# Patient Record
Sex: MALE | Race: WHITE | Employment: OTHER | URBAN - METROPOLITAN AREA
[De-identification: names, ages, dates, MRNs, and addresses within clinical notes are randomized per-mention and may not be internally consistent; named-entity substitution may affect disease eponyms.]

---

## 2018-02-05 ENCOUNTER — HOSPITAL ENCOUNTER (EMERGENCY)
Age: 73
Discharge: HOME | End: 2018-02-05
Attending: EMERGENCY MEDICINE
Payer: MEDICARE

## 2018-02-05 VITALS
HEART RATE: 58 BPM | SYSTOLIC BLOOD PRESSURE: 146 MMHG | RESPIRATION RATE: 18 BRPM | BODY MASS INDEX: 24.25 KG/M2 | DIASTOLIC BLOOD PRESSURE: 63 MMHG | OXYGEN SATURATION: 99 % | TEMPERATURE: 98.3 F | WEIGHT: 160 LBS | HEIGHT: 68 IN

## 2018-02-05 DIAGNOSIS — I10 ESSENTIAL HYPERTENSION: Primary | ICD-10-CM

## 2018-02-05 LAB
ALBUMIN SERPL-MCNC: 4.1 G/DL (ref 3.9–4.9)
ALP BLD-CCNC: 101 U/L (ref 35–104)
ALT SERPL-CCNC: 21 U/L (ref 0–41)
ANION GAP SERPL CALCULATED.3IONS-SCNC: 9 MEQ/L (ref 7–13)
AST SERPL-CCNC: 24 U/L (ref 0–40)
BILIRUB SERPL-MCNC: 0.5 MG/DL (ref 0–1.2)
BUN BLDV-MCNC: 13 MG/DL (ref 8–23)
CALCIUM SERPL-MCNC: 9.1 MG/DL (ref 8.6–10.2)
CHLORIDE BLD-SCNC: 105 MEQ/L (ref 98–107)
CO2: 28 MEQ/L (ref 22–29)
CREAT SERPL-MCNC: 0.79 MG/DL (ref 0.7–1.2)
EKG ATRIAL RATE: 51 BPM
EKG P AXIS: 57 DEGREES
EKG P-R INTERVAL: 154 MS
EKG Q-T INTERVAL: 422 MS
EKG QRS DURATION: 78 MS
EKG QTC CALCULATION (BAZETT): 388 MS
EKG R AXIS: -2 DEGREES
EKG T AXIS: 27 DEGREES
EKG VENTRICULAR RATE: 51 BPM
GFR AFRICAN AMERICAN: >60
GFR NON-AFRICAN AMERICAN: >60
GLOBULIN: 2.5 G/DL (ref 2.3–3.5)
GLUCOSE BLD-MCNC: 101 MG/DL (ref 74–109)
HCT VFR BLD CALC: 37.5 % (ref 42–52)
HEMOGLOBIN: 12.4 G/DL (ref 14–18)
MCH RBC QN AUTO: 30.9 PG (ref 27–31.3)
MCHC RBC AUTO-ENTMCNC: 33.1 % (ref 33–37)
MCV RBC AUTO: 93.4 FL (ref 80–100)
PDW BLD-RTO: 14.1 % (ref 11.5–14.5)
PLATELET # BLD: 194 K/UL (ref 130–400)
POTASSIUM SERPL-SCNC: 4.3 MEQ/L (ref 3.5–5.1)
RBC # BLD: 4.02 M/UL (ref 4.7–6.1)
SODIUM BLD-SCNC: 142 MEQ/L (ref 132–144)
TOTAL PROTEIN: 6.6 G/DL (ref 6.4–8.1)
WBC # BLD: 6.9 K/UL (ref 4.8–10.8)

## 2018-02-05 PROCEDURE — 36415 COLL VENOUS BLD VENIPUNCTURE: CPT

## 2018-02-05 PROCEDURE — 85027 COMPLETE CBC AUTOMATED: CPT

## 2018-02-05 PROCEDURE — 93005 ELECTROCARDIOGRAM TRACING: CPT

## 2018-02-05 PROCEDURE — 6370000000 HC RX 637 (ALT 250 FOR IP): Performed by: EMERGENCY MEDICINE

## 2018-02-05 PROCEDURE — 99283 EMERGENCY DEPT VISIT LOW MDM: CPT

## 2018-02-05 PROCEDURE — 80053 COMPREHEN METABOLIC PANEL: CPT

## 2018-02-05 RX ORDER — PRAVASTATIN SODIUM 20 MG
20 TABLET ORAL DAILY
COMMUNITY

## 2018-02-05 RX ORDER — LISINOPRIL 20 MG/1
20 TABLET ORAL DAILY
COMMUNITY

## 2018-02-05 RX ORDER — CARVEDILOL 6.25 MG/1
6.25 TABLET ORAL 2 TIMES DAILY
COMMUNITY

## 2018-02-05 RX ORDER — LISINOPRIL 10 MG/1
10 TABLET ORAL ONCE
Status: COMPLETED | OUTPATIENT
Start: 2018-02-05 | End: 2018-02-05

## 2018-02-05 RX ADMIN — LISINOPRIL 10 MG: 10 TABLET ORAL at 12:14

## 2018-02-05 ASSESSMENT — ENCOUNTER SYMPTOMS
FACIAL SWELLING: 0
EYE DISCHARGE: 0
ABDOMINAL PAIN: 0
SHORTNESS OF BREATH: 0
RHINORRHEA: 0
COLOR CHANGE: 0
VOMITING: 0

## 2018-02-05 NOTE — ED PROVIDER NOTES
here in the emergency department and his EKG and basic blood work looked normal.  He is encouraged to attempt to cut his lisinopril 20 mg tablets in half and take another half of a tablet for 30 mg total daily in the evening. He is to keep his Coreg dose the same. If he is able he is to record his blood pressures at different times throughout the day until he is able to follow up with his family care doctor, ideally in 3 days. Signs and symptoms which should prompt his urgent return to the emergency department are reviewed with the patient and his friend who is present at bedside and patient is discharged in stable condition. MDM    CRITICAL CARE TIME   Total Critical Care time was 0 minutes, excluding separately reportable procedures. There was a high probability of clinically significant/life threatening deterioration in the patient's condition which required my urgent intervention. CONSULTS:  None    PROCEDURES:  Unless otherwise noted below, none     Procedures    FINAL IMPRESSION      1.  Essential hypertension          DISPOSITION/PLAN   DISPOSITION Decision To Discharge 02/05/2018 12:50:00 PM      PATIENT REFERRED TO:  Maureen Johnson MD  [facility]  [phone]    In 3 days        DISCHARGE MEDICATIONS:  New Prescriptions    No medications on file          (Please note that portions of this note were completed with a voice recognition program.  Efforts were made to edit the dictations but occasionally words are mis-transcribed.)    Mario Bailey DO (electronically signed)  Attending Emergency Physician          Mario Bailey DO  02/05/18 8683

## 2019-04-01 ENCOUNTER — HOSPITAL ENCOUNTER (EMERGENCY)
Age: 74
Discharge: HOME | End: 2019-04-01
Payer: MEDICARE

## 2019-04-01 ENCOUNTER — APPOINTMENT (OUTPATIENT)
Dept: GENERAL RADIOLOGY | Age: 74
End: 2019-04-01
Payer: MEDICARE

## 2019-04-01 VITALS
DIASTOLIC BLOOD PRESSURE: 81 MMHG | SYSTOLIC BLOOD PRESSURE: 170 MMHG | WEIGHT: 155 LBS | BODY MASS INDEX: 23.49 KG/M2 | RESPIRATION RATE: 18 BRPM | TEMPERATURE: 97.6 F | OXYGEN SATURATION: 100 % | HEIGHT: 68 IN

## 2019-04-01 DIAGNOSIS — M54.2 NECK PAIN: Primary | ICD-10-CM

## 2019-04-01 PROCEDURE — 72050 X-RAY EXAM NECK SPINE 4/5VWS: CPT

## 2019-04-01 PROCEDURE — 6370000000 HC RX 637 (ALT 250 FOR IP): Performed by: NURSE PRACTITIONER

## 2019-04-01 PROCEDURE — 99283 EMERGENCY DEPT VISIT LOW MDM: CPT

## 2019-04-01 RX ORDER — ACETAMINOPHEN 500 MG
500 TABLET ORAL EVERY 6 HOURS PRN
Qty: 20 TABLET | Refills: 0 | Status: SHIPPED | OUTPATIENT
Start: 2019-04-01 | End: 2019-11-02 | Stop reason: ALTCHOICE

## 2019-04-01 RX ORDER — ACETAMINOPHEN 500 MG
1000 TABLET ORAL ONCE
Status: COMPLETED | OUTPATIENT
Start: 2019-04-01 | End: 2019-04-01

## 2019-04-01 RX ADMIN — ACETAMINOPHEN 1000 MG: 500 TABLET ORAL at 10:25

## 2019-04-01 ASSESSMENT — ENCOUNTER SYMPTOMS
SHORTNESS OF BREATH: 0
SORE THROAT: 0
SINUS PAIN: 0
COUGH: 0
NAUSEA: 0
VOMITING: 0
ABDOMINAL PAIN: 0
DIARRHEA: 0
BACK PAIN: 0

## 2019-04-01 ASSESSMENT — PAIN SCALES - GENERAL: PAINLEVEL_OUTOF10: 5

## 2019-04-01 NOTE — ED TRIAGE NOTES
Patient states  He had an ear ache last month and the clinic put him on atb, it has returned again and he is now having same pain

## 2019-04-01 NOTE — ED PROVIDER NOTES
full passive range of motion without pain. Neck supple. Muscular tenderness present. Cardiovascular: Normal rate and regular rhythm. Pulmonary/Chest: Effort normal and breath sounds normal.   Abdominal: Soft. Bowel sounds are normal. He exhibits no distension. There is no tenderness. Musculoskeletal: Normal range of motion. Neurological: He is alert and oriented to person, place, and time. He has normal reflexes. Skin: Skin is warm and dry. Psychiatric: Judgment normal.   Nursing note and vitals reviewed. All other labs were within normal range or not returned as of this dictation. EMERGENCY DEPARTMENT COURSE and DIFFERENTIALDIAGNOSIS/MDM:   Vitals:    Vitals:    04/01/19 0955 04/01/19 0957   BP: (!) 203/77 (!) 170/81   Resp: 18    Temp: 97.6 °F (36.4 °C)    SpO2: 100%    Weight: 155 lb (70.3 kg)    Height: 5' 8\" (1.727 m)             66 yr old male with neck pain. Xray were negative for fracture or dislocation. Prescription for Tylenol or capcaisin cream was given to the patient. F/U with PCP in 2-3 days. Patient verbalizes understanding. PROCEDURES:  Unless otherwise noted below, none     Procedures      FINAL IMPRESSION      1.  Neck pain          DISPOSITION/PLAN   DISPOSITION Decision To Discharge 04/01/2019 11:34:59 AM          BECCA Sapp CNP (electronically signed)  Attending Emergency Physician      BECCA Sapp CNP  04/01/19 6915

## 2019-11-02 ENCOUNTER — HOSPITAL ENCOUNTER (EMERGENCY)
Age: 74
Discharge: HOME | End: 2019-11-02
Payer: MEDICARE

## 2019-11-02 VITALS
WEIGHT: 152 LBS | HEIGHT: 68 IN | HEART RATE: 60 BPM | BODY MASS INDEX: 23.04 KG/M2 | OXYGEN SATURATION: 98 % | RESPIRATION RATE: 18 BRPM | TEMPERATURE: 98 F | SYSTOLIC BLOOD PRESSURE: 186 MMHG | DIASTOLIC BLOOD PRESSURE: 74 MMHG

## 2019-11-02 DIAGNOSIS — H61.21 IMPACTED CERUMEN OF RIGHT EAR: Primary | ICD-10-CM

## 2019-11-02 DIAGNOSIS — M54.2 NECK PAIN: ICD-10-CM

## 2019-11-02 PROCEDURE — 99282 EMERGENCY DEPT VISIT SF MDM: CPT

## 2019-11-02 RX ORDER — ACETAMINOPHEN 325 MG/1
650 TABLET ORAL EVERY 6 HOURS PRN
Qty: 60 TABLET | Refills: 2 | Status: SHIPPED | OUTPATIENT
Start: 2019-11-02

## 2019-11-02 ASSESSMENT — ENCOUNTER SYMPTOMS
SORE THROAT: 0
TROUBLE SWALLOWING: 0
VOMITING: 0
COUGH: 0
ABDOMINAL PAIN: 0
BACK PAIN: 0
NAUSEA: 0
SHORTNESS OF BREATH: 0
DIARRHEA: 0

## 2020-02-11 ENCOUNTER — HOSPITAL ENCOUNTER (EMERGENCY)
Age: 75
Discharge: HOME | End: 2020-02-12
Payer: MEDICARE

## 2020-02-11 ENCOUNTER — APPOINTMENT (OUTPATIENT)
Dept: GENERAL RADIOLOGY | Age: 75
End: 2020-02-11
Payer: MEDICARE

## 2020-02-11 VITALS
TEMPERATURE: 98 F | SYSTOLIC BLOOD PRESSURE: 150 MMHG | DIASTOLIC BLOOD PRESSURE: 67 MMHG | HEIGHT: 68 IN | RESPIRATION RATE: 18 BRPM | HEART RATE: 74 BPM | OXYGEN SATURATION: 97 % | WEIGHT: 143 LBS | BODY MASS INDEX: 21.67 KG/M2

## 2020-02-11 PROCEDURE — 99283 EMERGENCY DEPT VISIT LOW MDM: CPT

## 2020-02-11 PROCEDURE — 6370000000 HC RX 637 (ALT 250 FOR IP): Performed by: PHYSICIAN ASSISTANT

## 2020-02-11 PROCEDURE — 72050 X-RAY EXAM NECK SPINE 4/5VWS: CPT

## 2020-02-11 RX ORDER — IBUPROFEN 400 MG/1
400 TABLET ORAL ONCE
Status: COMPLETED | OUTPATIENT
Start: 2020-02-11 | End: 2020-02-11

## 2020-02-11 RX ORDER — CYCLOBENZAPRINE HCL 10 MG
10 TABLET ORAL ONCE
Status: COMPLETED | OUTPATIENT
Start: 2020-02-11 | End: 2020-02-11

## 2020-02-11 RX ADMIN — IBUPROFEN 400 MG: 400 TABLET, FILM COATED ORAL at 23:00

## 2020-02-11 RX ADMIN — CYCLOBENZAPRINE 10 MG: 10 TABLET, FILM COATED ORAL at 23:00

## 2020-02-11 ASSESSMENT — PAIN DESCRIPTION - ORIENTATION: ORIENTATION: LEFT

## 2020-02-11 ASSESSMENT — PAIN DESCRIPTION - PAIN TYPE
TYPE: ACUTE PAIN
TYPE: ACUTE PAIN

## 2020-02-11 ASSESSMENT — PAIN SCALES - GENERAL
PAINLEVEL_OUTOF10: 4
PAINLEVEL_OUTOF10: 7
PAINLEVEL_OUTOF10: 7

## 2020-02-11 ASSESSMENT — PAIN DESCRIPTION - LOCATION
LOCATION: BACK;NECK
LOCATION: NECK

## 2020-02-11 ASSESSMENT — PAIN DESCRIPTION - FREQUENCY: FREQUENCY: CONTINUOUS

## 2020-02-12 RX ORDER — CYCLOBENZAPRINE HCL 5 MG
5 TABLET ORAL 3 TIMES DAILY PRN
Qty: 10 TABLET | Refills: 0 | Status: SHIPPED | OUTPATIENT
Start: 2020-02-12

## 2020-02-12 RX ORDER — IBUPROFEN 400 MG/1
400 TABLET ORAL EVERY 8 HOURS PRN
Qty: 20 TABLET | Refills: 0 | Status: SHIPPED | OUTPATIENT
Start: 2020-02-12

## 2020-02-12 ASSESSMENT — ENCOUNTER SYMPTOMS
VOMITING: 0
NAUSEA: 0
ABDOMINAL DISTENTION: 0
EYE DISCHARGE: 0
COUGH: 0
COLOR CHANGE: 0
ANAL BLEEDING: 0
APNEA: 0
EYE PAIN: 0
BACK PAIN: 1
SHORTNESS OF BREATH: 0
PHOTOPHOBIA: 0
VOICE CHANGE: 0

## 2020-02-12 NOTE — ED PROVIDER NOTES
disturbance. All other systems reviewed and are negative. Except as noted above the remainder of the review of systems was reviewed and negative. PAST MEDICAL HISTORY     Past Medical History:   Diagnosis Date    Cancer ([address]     Hyperlipidemia     Hypertension     IBS (irritable bowel syndrome)          SURGICALHISTORY       Past Surgical History:   Procedure Laterality Date    SKIN CANCER EXCISION           CURRENT MEDICATIONS       Previous Medications    ACETAMINOPHEN (TYLENOL) 325 MG TABLET    Take 2 tablets by mouth every 6 hours as needed for Pain    CAPSAICIN (ZOSTRIX) 0.035 % CREA CREAM    Apply topically 3 times daily    CARVEDILOL (COREG) 6.25 MG TABLET    Take 6.25 mg by mouth 2 times daily    LISINOPRIL (PRINIVIL;ZESTRIL) 20 MG TABLET    Take 20 mg by mouth daily    PRAVASTATIN (PRAVACHOL) 20 MG TABLET    Take 20 mg by mouth daily       ALLERGIES     Ciprofloxacin and Sulfa antibiotics    FAMILY HISTORY     History reviewed. No pertinent family history.        SOCIAL HISTORY       Social History     Socioeconomic History    Marital status: Single     Spouse name: None    Number of children: None    Years of education: None    Highest education level: None   Occupational History    None   Social Needs    Financial resource strain: None    Food insecurity:     Worry: None     Inability: None    Transportation needs:     Medical: None     Non-medical: None   Tobacco Use    Smoking status: Former Smoker    Smokeless tobacco: Never Used   Substance and Sexual Activity    Alcohol use: Not Currently    Drug use: Never    Sexual activity: None   Lifestyle    Physical activity:     Days per week: None     Minutes per session: None    Stress: None   Relationships    Social connections:     Talks on phone: None     Gets together: None     Attends Shinto service: None     Active member of club or organization: None     Attends meetings of clubs or organizations: None Relationship status: None    Intimate partner violence:     Fear of current or ex partner: None     Emotionally abused: None     Physically abused: None     Forced sexual activity: None   Other Topics Concern    None   Social History Narrative    None       SCREENINGS      @FLOW([id number])@      PHYSICAL EXAM    (up to 7 for level 4, 8 or more for level 5)     ED Triage Vitals   BP Temp Temp Source Pulse Resp SpO2 Height Weight   02/11/20 2119 02/11/20 2117 02/11/20 2117 02/11/20 2117 02/11/20 2117 02/11/20 2117 02/11/20 2117 02/11/20 2117   (!) 150/67 98 °F (36.7 °C) Oral 74 18 97 % 5' 8\" (1.727 m) 143 lb (64.9 kg)       Physical Exam  Vitals signs and nursing note reviewed. Constitutional:       General: He is not in acute distress. Appearance: He is well-developed. HENT:      Head: Normocephalic and atraumatic. Right Ear: Tympanic membrane normal. There is no impacted cerumen. Left Ear: Tympanic membrane normal. There is no impacted cerumen. Nose: Nose normal.      Mouth/Throat:      Mouth: Mucous membranes are moist.      Pharynx: No oropharyngeal exudate or posterior oropharyngeal erythema. Eyes:      General:         Right eye: No discharge. Left eye: No discharge. Pupils: Pupils are equal, round, and reactive to light. Neck:      Musculoskeletal: Normal range of motion and neck supple. Cardiovascular:      Rate and Rhythm: Normal rate and regular rhythm. Pulses: Normal pulses. Heart sounds: Normal heart sounds. Pulmonary:      Effort: Pulmonary effort is normal. No respiratory distress. Breath sounds: Normal breath sounds. No stridor. No wheezing, rhonchi or rales. Chest:      Chest wall: No tenderness. Abdominal:      General: Bowel sounds are normal. There is no distension. Palpations: Abdomen is soft. Tenderness: There is no abdominal tenderness. Musculoskeletal: Normal range of motion. General: Tenderness present. Arms:    Skin:     General: Skin is warm. Findings: No erythema. Neurological:      General: No focal deficit present. Mental Status: He is alert and oriented to person, place, and time. Psychiatric:         Mood and Affect: Mood normal.         DIAGNOSTIC RESULTS     EKG: All EKG's are interpreted by the Emergency Department Physician who either signs or Co-signsthis chart in the absence of a cardiologist.         RADIOLOGY:   Lavone Lolling such as CT, Ultrasound and MRI are read by the radiologist. Plain radiographic images are visualized and preliminarily interpreted by the emergency physician with the below findings:    nad    Interpretation per the Radiologist below, if available at the time ofthis note:    XR CERVICAL SPINE (4-5 VIEWS)    (Results Pending)         ED BEDSIDE ULTRASOUND:   Performed by ED Physician - none    LABS:  Labs Reviewed - No data to display    All other labs were within normal range or not returned as of this dictation. EMERGENCY DEPARTMENT COURSE and DIFFERENTIAL DIAGNOSIS/MDM:   Vitals:    Vitals:    02/11/20 2117 02/11/20 2119   BP:  (!) 150/67   Pulse: 74    Resp: 18    Temp: 98 °F (36.7 °C)    TempSrc: Oral    SpO2: 97%    Weight: 143 lb (64.9 kg)    Height: 5' 8\" (1.727 m)             MDM  Number of Diagnoses or Management Options  Diagnosis management comments: Has reproducible tenderness with palpation left paracervical will add x-rays patient has x-rays from prior visit which are similar in appearance patient feels better with medication has had a 3-day history of symptoms. He denies chest pain denies shortness of breath. Amount and/or Complexity of Data Reviewed  Tests in the radiology section of CPT®: reviewed and ordered        CRITICAL CARE TIME         CONSULTS:  None    PROCEDURES:  Unless otherwise noted below, none     Procedures    FINAL IMPRESSION      1. Neck pain    2.  Spasm of muscle          DISPOSITION/PLAN   DISPOSITION Decision To Discharge 02/12/2020 12:10:36 AM      PATIENT REFERRED TO:  Susanne Quinteros MD  [address] [place] [address]  [phone]    Schedule an appointment as soon as possible for a visit in 2 days      [facility]) ED  [address] [facility]  [phone]    If symptoms worsen      DISCHARGE MEDICATIONS:  New Prescriptions    CYCLOBENZAPRINE (FLEXERIL) 5 MG TABLET    Take 1 tablet by mouth 3 times daily as needed for Muscle spasms    IBUPROFEN (IBU) 400 MG TABLET    Take 1 tablet by mouth every 8 hours as needed for Pain          (Please note that portions of this note were completed with a voice recognition program.  Efforts were made to edit the dictations but occasionally words are mis-transcribed.)    Juarez Boateng PA-C (electronically signed)  Attending Emergency Physician       Juarez Boateng PA-C  02/12/20 0016

## 2023-12-27 ENCOUNTER — HOSPITAL ENCOUNTER (EMERGENCY)
Age: 78
Discharge: HOME | End: 2023-12-27
Attending: EMERGENCY MEDICINE
Payer: MEDICARE

## 2023-12-27 VITALS
DIASTOLIC BLOOD PRESSURE: 92 MMHG | RESPIRATION RATE: 18 BRPM | SYSTOLIC BLOOD PRESSURE: 182 MMHG | TEMPERATURE: 98 F | WEIGHT: 139 LBS | OXYGEN SATURATION: 98 % | BODY MASS INDEX: 21.07 KG/M2 | HEART RATE: 59 BPM | HEIGHT: 68 IN

## 2023-12-27 DIAGNOSIS — I10 ASYMPTOMATIC HYPERTENSION: Primary | ICD-10-CM

## 2023-12-27 PROCEDURE — 99282 EMERGENCY DEPT VISIT SF MDM: CPT

## 2023-12-27 NOTE — ED TRIAGE NOTES
Patient arrived via private car and by himself concerned due to the fact that his blood pressure was up when he checked it at the drug store this morning. He takes his BP meds as ordered.

## 2024-01-27 ENCOUNTER — HOSPITAL ENCOUNTER (EMERGENCY)
Age: 79
Discharge: HOME | End: 2024-01-27
Payer: MEDICARE

## 2024-01-27 ENCOUNTER — APPOINTMENT (OUTPATIENT)
Dept: GENERAL RADIOLOGY | Age: 79
End: 2024-01-27
Payer: MEDICARE

## 2024-01-27 VITALS
HEIGHT: 68 IN | HEART RATE: 56 BPM | WEIGHT: 139 LBS | BODY MASS INDEX: 21.07 KG/M2 | RESPIRATION RATE: 15 BRPM | OXYGEN SATURATION: 96 % | SYSTOLIC BLOOD PRESSURE: 149 MMHG | TEMPERATURE: 98.1 F | DIASTOLIC BLOOD PRESSURE: 73 MMHG

## 2024-01-27 DIAGNOSIS — I10 PRIMARY HYPERTENSION: Primary | ICD-10-CM

## 2024-01-27 LAB
ALBUMIN SERPL-MCNC: 4 G/DL (ref 3.5–4.6)
ALP SERPL-CCNC: 81 U/L (ref 35–104)
ALT SERPL-CCNC: 18 U/L (ref 0–41)
ANION GAP SERPL CALCULATED.3IONS-SCNC: 6 MEQ/L (ref 9–15)
AST SERPL-CCNC: 19 U/L (ref 0–40)
B PARAP IS1001 DNA NPH QL NAA+NON-PROBE: NOT DETECTED
B PERT.PT PRMT NPH QL NAA+NON-PROBE: NOT DETECTED
BASOPHILS # BLD: 0 K/UL (ref 0–0.2)
BASOPHILS NFR BLD: 0.7 %
BILIRUB SERPL-MCNC: 0.4 MG/DL (ref 0.2–0.7)
BILIRUB UR QL STRIP: NEGATIVE
BUN SERPL-MCNC: 18 MG/DL (ref 8–23)
C PNEUM DNA NPH QL NAA+NON-PROBE: NOT DETECTED
CALCIUM SERPL-MCNC: 8.8 MG/DL (ref 8.5–9.9)
CHLORIDE SERPL-SCNC: 105 MEQ/L (ref 95–107)
CLARITY UR: CLEAR
CO2 SERPL-SCNC: 29 MEQ/L (ref 20–31)
COLOR UR: YELLOW
CREAT SERPL-MCNC: 1.01 MG/DL (ref 0.7–1.2)
EKG ATRIAL RATE: 55 BPM
EKG P AXIS: 100 DEGREES
EKG P-R INTERVAL: 148 MS
EKG Q-T INTERVAL: 404 MS
EKG QRS DURATION: 80 MS
EKG QTC CALCULATION (BAZETT): 386 MS
EKG R AXIS: -5 DEGREES
EKG T AXIS: 37 DEGREES
EKG VENTRICULAR RATE: 55 BPM
EOSINOPHIL # BLD: 0.4 K/UL (ref 0–0.7)
EOSINOPHIL NFR BLD: 6 %
ERYTHROCYTE [DISTWIDTH] IN BLOOD BY AUTOMATED COUNT: 13.7 % (ref 11.5–14.5)
FLUAV RNA NPH QL NAA+NON-PROBE: NOT DETECTED
FLUBV RNA NPH QL NAA+NON-PROBE: NOT DETECTED
GLOBULIN SER CALC-MCNC: 2.5 G/DL (ref 2.3–3.5)
GLUCOSE SERPL-MCNC: 103 MG/DL (ref 70–99)
GLUCOSE UR STRIP-MCNC: NEGATIVE MG/DL
HADV DNA NPH QL NAA+NON-PROBE: NOT DETECTED
HCOV 229E RNA NPH QL NAA+NON-PROBE: NOT DETECTED
HCOV HKU1 RNA NPH QL NAA+NON-PROBE: NOT DETECTED
HCOV NL63 RNA NPH QL NAA+NON-PROBE: NOT DETECTED
HCOV OC43 RNA NPH QL NAA+NON-PROBE: NOT DETECTED
HCT VFR BLD AUTO: 35.9 % (ref 42–52)
HGB BLD-MCNC: 11.6 G/DL (ref 14–18)
HGB UR QL STRIP: NEGATIVE
HMPV RNA NPH QL NAA+NON-PROBE: NOT DETECTED
HPIV1 RNA NPH QL NAA+NON-PROBE: NOT DETECTED
HPIV2 RNA NPH QL NAA+NON-PROBE: NOT DETECTED
HPIV3 RNA NPH QL NAA+NON-PROBE: NOT DETECTED
HPIV4 RNA NPH QL NAA+NON-PROBE: NOT DETECTED
INR PPP: 1
KETONES UR STRIP-MCNC: NEGATIVE MG/DL
LACTATE BLDV-SCNC: 0.7 MMOL/L (ref 0.5–2.2)
LEUKOCYTE ESTERASE UR QL STRIP: NEGATIVE
LYMPHOCYTES # BLD: 2.5 K/UL (ref 1–4.8)
LYMPHOCYTES NFR BLD: 42.2 %
M PNEUMO DNA NPH QL NAA+NON-PROBE: NOT DETECTED
MAGNESIUM SERPL-MCNC: 2 MG/DL (ref 1.7–2.4)
MCH RBC QN AUTO: 30.6 PG (ref 27–31.3)
MCHC RBC AUTO-ENTMCNC: 32.3 % (ref 33–37)
MCV RBC AUTO: 94.7 FL (ref 79–92.2)
MONOCYTES # BLD: 0.7 K/UL (ref 0.2–0.8)
MONOCYTES NFR BLD: 11.4 %
NEUTROPHILS # BLD: 2.3 K/UL (ref 1.4–6.5)
NEUTS SEG NFR BLD: 39.5 %
NITRITE UR QL STRIP: NEGATIVE
NRBC BLD-RTO: 0 /100 WBC
PH UR STRIP: 7 [PH] (ref 5–9)
PLATELET # BLD AUTO: 177 K/UL (ref 130–400)
POTASSIUM SERPL-SCNC: 3.9 MEQ/L (ref 3.4–4.9)
PROT SERPL-MCNC: 6.5 G/DL (ref 6.3–8)
PROT UR STRIP-MCNC: NEGATIVE MG/DL
PROTHROMBIN TIME: 14 SEC (ref 12.3–14.9)
RBC # BLD AUTO: 3.79 M/UL (ref 4.7–6.1)
RSV RNA NPH QL NAA+NON-PROBE: NOT DETECTED
RV+EV RNA NPH QL NAA+NON-PROBE: NOT DETECTED
SARS-COV-2 RNA NPH QL NAA+NON-PROBE: NOT DETECTED
SODIUM SERPL-SCNC: 140 MEQ/L (ref 135–144)
SP GR UR STRIP: 1.01 (ref 1–1.03)
TROPONIN, HIGH SENSITIVITY: 12 NG/L (ref 0–19)
TROPONIN, HIGH SENSITIVITY: 12 NG/L (ref 0–19)
URINE REFLEX TO CULTURE: NORMAL
UROBILINOGEN UR STRIP-ACNC: 1 E.U./DL
WBC # BLD AUTO: 5.8 K/UL (ref 4.8–10.8)

## 2024-01-27 PROCEDURE — 85610 PROTHROMBIN TIME: CPT

## 2024-01-27 PROCEDURE — 96360 HYDRATION IV INFUSION INIT: CPT

## 2024-01-27 PROCEDURE — 80053 COMPREHEN METABOLIC PANEL: CPT

## 2024-01-27 PROCEDURE — 36415 COLL VENOUS BLD VENIPUNCTURE: CPT

## 2024-01-27 PROCEDURE — 0202U NFCT DS 22 TRGT SARS-COV-2: CPT

## 2024-01-27 PROCEDURE — 84145 PROCALCITONIN (PCT): CPT

## 2024-01-27 PROCEDURE — 83735 ASSAY OF MAGNESIUM: CPT

## 2024-01-27 PROCEDURE — 93005 ELECTROCARDIOGRAM TRACING: CPT | Performed by: PHYSICIAN ASSISTANT

## 2024-01-27 PROCEDURE — 83605 ASSAY OF LACTIC ACID: CPT

## 2024-01-27 PROCEDURE — 2580000003 HC RX 258: Performed by: PHYSICIAN ASSISTANT

## 2024-01-27 PROCEDURE — 81003 URINALYSIS AUTO W/O SCOPE: CPT

## 2024-01-27 PROCEDURE — 99285 EMERGENCY DEPT VISIT HI MDM: CPT

## 2024-01-27 PROCEDURE — 71045 X-RAY EXAM CHEST 1 VIEW: CPT

## 2024-01-27 PROCEDURE — 84484 ASSAY OF TROPONIN QUANT: CPT

## 2024-01-27 PROCEDURE — 85025 COMPLETE CBC W/AUTO DIFF WBC: CPT

## 2024-01-27 RX ORDER — 0.9 % SODIUM CHLORIDE 0.9 %
500 INTRAVENOUS SOLUTION INTRAVENOUS ONCE
Status: COMPLETED | OUTPATIENT
Start: 2024-01-27 | End: 2024-01-27

## 2024-01-27 RX ADMIN — SODIUM CHLORIDE 500 ML: 9 INJECTION, SOLUTION INTRAVENOUS at 21:50

## 2024-01-27 ASSESSMENT — ENCOUNTER SYMPTOMS
CHEST TIGHTNESS: 0
SHORTNESS OF BREATH: 0
NAUSEA: 1
ABDOMINAL PAIN: 0

## 2024-01-27 ASSESSMENT — LIFESTYLE VARIABLES
HOW MANY STANDARD DRINKS CONTAINING ALCOHOL DO YOU HAVE ON A TYPICAL DAY: PATIENT DOES NOT DRINK
HOW OFTEN DO YOU HAVE A DRINK CONTAINING ALCOHOL: NEVER

## 2024-01-27 ASSESSMENT — PAIN - FUNCTIONAL ASSESSMENT
PAIN_FUNCTIONAL_ASSESSMENT: NONE - DENIES PAIN
PAIN_FUNCTIONAL_ASSESSMENT: NONE - DENIES PAIN

## 2024-01-28 LAB — PROCALCITONIN SERPL IA-MCNC: 0.04 NG/ML (ref 0–0.15)

## 2024-01-28 NOTE — ED PROVIDER NOTES
[facility] ED  EMERGENCY DEPARTMENT ENCOUNTER      Pt Name: Costa Ferrera  MRN: [id number]  Birthdate 1945  Date of evaluation: 1/27/2024  Provider: Silver Phillips PA-C  9:17 PM EST      CHIEF COMPLAINT       Chief Complaint   Patient presents with    Hypertension         HISTORY OF PRESENT ILLNESS   (Location/Symptom, Timing/Onset, Context/Setting, Quality, Duration, Modifying Factors, Severity)  Note limiting factors.   Costa Ferrera is a 78 y.o. male who presents to the emergency department stating that she had an episode of diaphoresis, nausea and subjective fever that began this evening.  Patient denied any chest pain associated with the symptoms, and denied any abdominal pain associated with the symptoms.  EMS had reported that the patient was hypertensive however on their arrival to the emergency department patient's blood pressure is 168/63.  Patient states that he is feeling better now    HPI    Nursing Notes were reviewed.    REVIEW OF SYSTEMS    (2-9 systems for level 4, 10 or more for level 5)     Review of Systems   Constitutional:  Positive for chills, diaphoresis and fever.   Respiratory:  Negative for chest tightness and shortness of breath.    Cardiovascular:  Negative for chest pain and palpitations.   Gastrointestinal:  Positive for nausea. Negative for abdominal pain.   All other systems reviewed and are negative.      Except as noted above the remainder of the review of systems was reviewed and negative.       PAST MEDICAL HISTORY     Past Medical History:   Diagnosis Date    Cancer (HCC)     Hyperlipidemia     Hypertension     IBS (irritable bowel syndrome)          SURGICAL HISTORY       Past Surgical History:   Procedure Laterality Date    SKIN CANCER EXCISION           CURRENT MEDICATIONS       Previous Medications    ACETAMINOPHEN (TYLENOL) 325 MG TABLET    Take 2 tablets by mouth every 6 hours as needed for Pain    CAPSAICIN (ZOSTRIX) 0.035 % CREA CREAM    Apply

## 2024-01-28 NOTE — ED TRIAGE NOTES
PT presents to ED with c/o HTN, fever and nausea.   PTA /80, no treatment prior to arrival.    Upon arrival pt c/o feeling sweaty and upset stomach earlier this evening.   PT denies SOB or CP.

## 2024-01-29 LAB
EKG ATRIAL RATE: 55 BPM
EKG P AXIS: 100 DEGREES
EKG P-R INTERVAL: 148 MS
EKG Q-T INTERVAL: 404 MS
EKG QRS DURATION: 80 MS
EKG QTC CALCULATION (BAZETT): 386 MS
EKG R AXIS: -5 DEGREES
EKG T AXIS: 37 DEGREES
EKG VENTRICULAR RATE: 55 BPM

## 2024-06-18 ENCOUNTER — HOSPITAL ENCOUNTER (EMERGENCY)
Age: 79
Discharge: HOME | End: 2024-06-18
Attending: EMERGENCY MEDICINE
Payer: MEDICARE

## 2024-06-18 ENCOUNTER — APPOINTMENT (OUTPATIENT)
Dept: CT IMAGING | Age: 79
End: 2024-06-18
Payer: MEDICARE

## 2024-06-18 VITALS
WEIGHT: 130 LBS | RESPIRATION RATE: 16 BRPM | TEMPERATURE: 98.2 F | HEIGHT: 68 IN | OXYGEN SATURATION: 100 % | SYSTOLIC BLOOD PRESSURE: 121 MMHG | DIASTOLIC BLOOD PRESSURE: 75 MMHG | BODY MASS INDEX: 19.7 KG/M2 | HEART RATE: 67 BPM

## 2024-06-18 DIAGNOSIS — R42 LIGHTHEADEDNESS: Primary | ICD-10-CM

## 2024-06-18 DIAGNOSIS — J43.9 PULMONARY EMPHYSEMA, UNSPECIFIED EMPHYSEMA TYPE (HCC): ICD-10-CM

## 2024-06-18 LAB
ALBUMIN SERPL-MCNC: 3.8 G/DL (ref 3.5–4.6)
ALP SERPL-CCNC: 67 U/L (ref 35–104)
ALT SERPL-CCNC: 12 U/L (ref 0–41)
ANION GAP SERPL CALCULATED.3IONS-SCNC: 6 MEQ/L (ref 9–15)
APTT PPP: 27 SEC (ref 24.4–36.8)
AST SERPL-CCNC: 17 U/L (ref 0–40)
BASOPHILS # BLD: 0.1 K/UL (ref 0–0.2)
BASOPHILS NFR BLD: 0.8 %
BILIRUB SERPL-MCNC: 0.7 MG/DL (ref 0.2–0.7)
BUN SERPL-MCNC: 17 MG/DL (ref 8–23)
CALCIUM SERPL-MCNC: 8.8 MG/DL (ref 8.5–9.9)
CHLORIDE SERPL-SCNC: 107 MEQ/L (ref 95–107)
CO2 SERPL-SCNC: 29 MEQ/L (ref 20–31)
CREAT SERPL-MCNC: 0.88 MG/DL (ref 0.7–1.2)
EOSINOPHIL # BLD: 0.3 K/UL (ref 0–0.7)
EOSINOPHIL NFR BLD: 3.5 %
ERYTHROCYTE [DISTWIDTH] IN BLOOD BY AUTOMATED COUNT: 13.6 % (ref 11.5–14.5)
GLOBULIN SER CALC-MCNC: 2.2 G/DL (ref 2.3–3.5)
GLUCOSE SERPL-MCNC: 105 MG/DL (ref 70–99)
HCT VFR BLD AUTO: 33.9 % (ref 42–52)
HGB BLD-MCNC: 11.4 G/DL (ref 14–18)
INR PPP: 1.1
LIPASE SERPL-CCNC: 20 U/L (ref 12–95)
LYMPHOCYTES # BLD: 1.5 K/UL (ref 1–4.8)
LYMPHOCYTES NFR BLD: 19.4 %
MAGNESIUM SERPL-MCNC: 2 MG/DL (ref 1.7–2.4)
MCH RBC QN AUTO: 32 PG (ref 27–31.3)
MCHC RBC AUTO-ENTMCNC: 33.6 % (ref 33–37)
MCV RBC AUTO: 95.2 FL (ref 79–92.2)
MONOCYTES # BLD: 0.6 K/UL (ref 0.2–0.8)
MONOCYTES NFR BLD: 7.8 %
NEUTROPHILS # BLD: 5.3 K/UL (ref 1.4–6.5)
NEUTS SEG NFR BLD: 68.2 %
PERFORMED ON: NORMAL
PLATELET # BLD AUTO: 163 K/UL (ref 130–400)
POC CREATININE WHOLE BLOOD: 1
POC CREATININE: 1 MG/DL (ref 0.8–1.3)
POC SAMPLE TYPE: NORMAL
POTASSIUM SERPL-SCNC: 4.1 MEQ/L (ref 3.4–4.9)
PROT SERPL-MCNC: 6 G/DL (ref 6.3–8)
PROTHROMBIN TIME: 14.4 SEC (ref 12.3–14.9)
RBC # BLD AUTO: 3.56 M/UL (ref 4.7–6.1)
SODIUM SERPL-SCNC: 142 MEQ/L (ref 135–144)
TROPONIN, HIGH SENSITIVITY: 13 NG/L (ref 0–19)
TROPONIN, HIGH SENSITIVITY: 14 NG/L (ref 0–19)
TSH REFLEX: 0.84 UIU/ML (ref 0.44–3.86)
WBC # BLD AUTO: 7.7 K/UL (ref 4.8–10.8)

## 2024-06-18 PROCEDURE — 97161 PT EVAL LOW COMPLEX 20 MIN: CPT

## 2024-06-18 PROCEDURE — 99285 EMERGENCY DEPT VISIT HI MDM: CPT

## 2024-06-18 PROCEDURE — 85025 COMPLETE CBC W/AUTO DIFF WBC: CPT

## 2024-06-18 PROCEDURE — 70498 CT ANGIOGRAPHY NECK: CPT

## 2024-06-18 PROCEDURE — 97165 OT EVAL LOW COMPLEX 30 MIN: CPT

## 2024-06-18 PROCEDURE — 93005 ELECTROCARDIOGRAM TRACING: CPT | Performed by: EMERGENCY MEDICINE

## 2024-06-18 PROCEDURE — 6360000004 HC RX CONTRAST MEDICATION: Performed by: EMERGENCY MEDICINE

## 2024-06-18 PROCEDURE — 70496 CT ANGIOGRAPHY HEAD: CPT

## 2024-06-18 PROCEDURE — 6370000000 HC RX 637 (ALT 250 FOR IP): Performed by: EMERGENCY MEDICINE

## 2024-06-18 PROCEDURE — 80053 COMPREHEN METABOLIC PANEL: CPT

## 2024-06-18 PROCEDURE — 84443 ASSAY THYROID STIM HORMONE: CPT

## 2024-06-18 PROCEDURE — 83735 ASSAY OF MAGNESIUM: CPT

## 2024-06-18 PROCEDURE — 85730 THROMBOPLASTIN TIME PARTIAL: CPT

## 2024-06-18 PROCEDURE — 83690 ASSAY OF LIPASE: CPT

## 2024-06-18 PROCEDURE — 84484 ASSAY OF TROPONIN QUANT: CPT

## 2024-06-18 PROCEDURE — 97162 PT EVAL MOD COMPLEX 30 MIN: CPT

## 2024-06-18 PROCEDURE — 6360000002 HC RX W HCPCS: Performed by: EMERGENCY MEDICINE

## 2024-06-18 PROCEDURE — 71275 CT ANGIOGRAPHY CHEST: CPT

## 2024-06-18 PROCEDURE — 85610 PROTHROMBIN TIME: CPT

## 2024-06-18 PROCEDURE — 2580000003 HC RX 258: Performed by: EMERGENCY MEDICINE

## 2024-06-18 PROCEDURE — 36415 COLL VENOUS BLD VENIPUNCTURE: CPT

## 2024-06-18 RX ORDER — MECLIZINE HCL 12.5 MG/1
12.5 TABLET ORAL 3 TIMES DAILY PRN
Qty: 15 TABLET | Refills: 0 | Status: SHIPPED | OUTPATIENT
Start: 2024-06-18 | End: 2024-06-28

## 2024-06-18 RX ORDER — 0.9 % SODIUM CHLORIDE 0.9 %
500 INTRAVENOUS SOLUTION INTRAVENOUS ONCE
Status: COMPLETED | OUTPATIENT
Start: 2024-06-18 | End: 2024-06-18

## 2024-06-18 RX ORDER — ONDANSETRON 2 MG/ML
4 INJECTION INTRAMUSCULAR; INTRAVENOUS ONCE
Status: COMPLETED | OUTPATIENT
Start: 2024-06-18 | End: 2024-06-18

## 2024-06-18 RX ORDER — MECLIZINE HYDROCHLORIDE 25 MG/1
12.5 TABLET ORAL ONCE
Status: COMPLETED | OUTPATIENT
Start: 2024-06-18 | End: 2024-06-18

## 2024-06-18 RX ADMIN — ONDANSETRON 4 MG: 2 INJECTION INTRAMUSCULAR; INTRAVENOUS at 12:26

## 2024-06-18 RX ADMIN — MECLIZINE HYDROCHLORIDE 12.5 MG: 25 TABLET ORAL at 12:26

## 2024-06-18 RX ADMIN — IOPAMIDOL 150 ML: 755 INJECTION, SOLUTION INTRAVENOUS at 12:07

## 2024-06-18 RX ADMIN — SODIUM CHLORIDE 500 ML: 9 INJECTION, SOLUTION INTRAVENOUS at 12:26

## 2024-06-18 ASSESSMENT — ENCOUNTER SYMPTOMS
PHOTOPHOBIA: 0
VOMITING: 0
SHORTNESS OF BREATH: 0
NAUSEA: 1
ABDOMINAL PAIN: 0

## 2024-06-18 ASSESSMENT — PAIN DESCRIPTION - LOCATION: LOCATION: BACK

## 2024-06-18 ASSESSMENT — PAIN - FUNCTIONAL ASSESSMENT: PAIN_FUNCTIONAL_ASSESSMENT: 0-10

## 2024-06-18 ASSESSMENT — PAIN SCALES - GENERAL: PAINLEVEL_OUTOF10: 7

## 2024-06-18 NOTE — ED PROVIDER NOTES
workup pursued. NIH 0, no indication for code BAT.  No midline tenderness, denies motor or sensory complaints or  complaints, doubt acute cord compression.  Patient initially given 500 mL fluid bolus, zofran and Antivert, will reevaluate.  Symptom resolution  History and physical not highly suspicious of pneumonia, no indication for antibiotics  From his vertigo standpoint he says he feels much better.  Actually request Antivert to go home with. He wants to go home. Hemodynamically stable.  Cardiac biomarkers not consistent with acute ischemia  PE ruled out  Large vessel occlusion ruled out  I spoke to dr dr nixon, he reviewed the patient's images, does not believe that he has pneumomediastinum or requires inpatient management or observation for repeat scan, says that he needs to follow-up with him in clinic, pt agreeable, f/u and return precautions  Hemodynamically stable  Ambulatory, tolerating p.o.    CONSULTS:  None    PROCEDURES:  Unless otherwise noted below, none     Procedures        FINAL IMPRESSION      1. Lightheadedness    2. Pulmonary emphysema, unspecified emphysema type (HCC)          DISPOSITION/PLAN   DISPOSITION Decision To Discharge 06/18/2024 02:07:44 PM      PATIENT REFERRED TO:  Pham Sotelo, APRN - CNP  [address] [id number]  [phone]          Oswaldo Nixon MD  [address] [place], [place] [address] [phone]            DISCHARGE MEDICATIONS:  New Prescriptions    MECLIZINE (ANTIVERT) 12.5 MG TABLET    Take 1 tablet by mouth 3 times daily as needed for Dizziness     Controlled Substances Monitoring:          No data to display                (Please note that portions of this note were completed with a voice recognition program.  Efforts were made to edit the dictations but occasionally words are mis-transcribed.)    Joel Erickson MD (electronically signed)  Attending Emergency Physician            Joel Erickson MD  06/18/24 1115

## 2024-06-18 NOTE — CARE COORDINATION
PT/OT evaluations are done and the therapists report that the patient is not in need of inpatient rehab treatment. This nurse spoke with his gloria Bridges and informed her of this as well as Dr Erickson. Yuliana reports that the patient is not homebound and drives. A packet of information on senior resources is given to the patient. The patient was given food and water. Teaching done about avoiding falls by not changing positions quickly and to consider keeping a urinal by his bedside for use at night. He states understanding.

## 2024-06-18 NOTE — PROGRESS NOTES
[facility] OCCUPATIONAL THERAPY EVALUATION - ACUTE     NAME: Costa Ferrera  : 1945 (79 y.o.)  MRN: [id number]  CODE STATUS: No Order  Room:     Date of Service: 2024    Patient Diagnosis(es): No admission diagnoses are documented for this encounter.   There are no problems to display for this patient.       Past Medical History:   Diagnosis Date    Cancer (HCC)     Hyperlipidemia     Hypertension     IBS (irritable bowel syndrome)      Past Surgical History:   Procedure Laterality Date    SKIN CANCER EXCISION          Restrictions  Restrictions/Precautions: Fall Risk, Up as Tolerated     Safety Devices: Safety Devices  Type of Devices: All fall risk precautions in place     Patient's date of birth confirmed: Yes    General:  Chart Reviewed: Yes  Patient assessed for rehabilitation services?: Yes    Subjective  Subjective: \"I want to get home\"       Pain at start of treatment: No    Pain at end of treatment: No    Location:   Description:   Nursing notified: Not Applicable  RN:   Intervention: None    Prior Level of Function:  Social/Functional History  Lives With: Alone  Type of Home: Apartment  Home Layout: One level (2nd floor- 14 steps with railing)  Home Access: Level entry  Bathroom Shower/Tub: Tub/Shower unit (Cut out tub)  Bathroom Equipment: None  Home Equipment: Cane, Walker - Rolling  Has the patient had two or more falls in the past year or any fall with injury in the past year?: No  ADL Assistance: Independent  Homemaking Assistance: Independent  Ambulation Assistance: Independent (No AD)  Transfer Assistance: Independent  Active : Yes    OBJECTIVE:     Orientation Status:  Orientation  Overall Orientation Status: Within Functional Limits  Orientation Level: Oriented to place;Oriented to time;Oriented to situation;Oriented to person    Observation:  Observation/Palpation  Posture: Good  Observation: Pt alert and attentive, no acute distress, on RA    Cognition

## 2024-06-18 NOTE — CARE COORDINATION
The patient stated to Dr Erickson and also this nurse that he wants to go home stating \"I have responsibilities at home\" this nurse called Yuliana, the patient's HCPOA who is speaking with the patient.     Pt's POA Yuliana states that the patient is now willing to be evaluated for rehab and would go. Dr Erickson is notified.

## 2024-06-18 NOTE — PLAN OF CARE
See OT evaluation for all goals and OT POC. Electronically signed by Lala Duron OTR/L on 6/18/2024 at 3:15 PM

## 2024-06-18 NOTE — ED TRIAGE NOTES
Pt is from home with c/o dizziness and nausea   Pt states it started this am   Pt is A&Ox4  Pt is afebrile   Pt states his back hurts (7/10)

## 2024-06-18 NOTE — PROGRESS NOTES
Physical Therapy Med Surg Initial Assessment  Facility/Department: [facility] ED  Room:     NAME: Costa Ferrera  : 1945 (79 y.o.)  MRN: [id number]  CODE STATUS: No Order    Date of Service: 2024    Patient Diagnosis(es): No admission diagnoses are documented for this encounter.   Chief Complaint   Patient presents with    Dizziness    Nausea   There are no problems to display for this patient.     Past Medical History:   Diagnosis Date    Cancer (HCC)     Hyperlipidemia     Hypertension     IBS (irritable bowel syndrome)      Past Surgical History:   Procedure Laterality Date    SKIN CANCER EXCISION       Chart Reviewed: Yes  Patient assessed for rehabilitation services?: Yes  Family / Caregiver Present: No    Restrictions:  Restrictions/Precautions: Fall Risk, Up as Tolerated     SUBJECTIVE:   Subjective: Pt denies pain    Pain   Pre treatment screening: denies  Post treatment screening denies    Prior Level of Function:  Social/Functional History  Lives With: Alone  Type of Home: Apartment  Home Layout: One level (2nd floor- 14 steps with railing)  Home Access: Level entry  Bathroom Shower/Tub: Tub/Shower unit (Cut out tub)  Bathroom Equipment: None  Home Equipment: Cane, Walker - Rolling  Has the patient had two or more falls in the past year or any fall with injury in the past year?: No  ADL Assistance: Independent  Homemaking Assistance: Independent  Ambulation Assistance: Independent (No AD)  Transfer Assistance: Independent  Active : Yes    OBJECTIVE:   Vision  Vision: Impaired  Vision Exceptions: Wears glasses for reading  Hearing: Within functional limits    Cognition:  Overall Orientation Status: Within Functional Limits  Orientation Level: Oriented to place, Oriented to time, Oriented to situation, Oriented to person  Follows Commands: Within Functional Limits  Overall Cognitive Status: WFL    Observation/Palpation  Posture: Good  Observation: alert, cooperative, no

## 2024-06-19 LAB
EKG ATRIAL RATE: 54 BPM
EKG P AXIS: 67 DEGREES
EKG P-R INTERVAL: 150 MS
EKG Q-T INTERVAL: 404 MS
EKG QRS DURATION: 82 MS
EKG QTC CALCULATION (BAZETT): 383 MS
EKG R AXIS: 14 DEGREES
EKG T AXIS: 55 DEGREES
EKG VENTRICULAR RATE: 54 BPM

## 2024-07-02 ENCOUNTER — OFFICE VISIT (OUTPATIENT)
Dept: PULMONOLOGY | Age: 79
End: 2024-07-02
Payer: MEDICARE

## 2024-07-02 VITALS
OXYGEN SATURATION: 99 % | SYSTOLIC BLOOD PRESSURE: 156 MMHG | BODY MASS INDEX: 19.31 KG/M2 | WEIGHT: 127 LBS | DIASTOLIC BLOOD PRESSURE: 74 MMHG | HEART RATE: 59 BPM

## 2024-07-02 DIAGNOSIS — J43.9 PULMONARY EMPHYSEMA, UNSPECIFIED EMPHYSEMA TYPE (HCC): Primary | ICD-10-CM

## 2024-07-02 DIAGNOSIS — R63.4 WEIGHT LOSS, ABNORMAL: ICD-10-CM

## 2024-07-02 DIAGNOSIS — R93.89 ABNORMAL CT OF THE CHEST: ICD-10-CM

## 2024-07-02 DIAGNOSIS — J43.9 PULMONARY EMPHYSEMA, UNSPECIFIED EMPHYSEMA TYPE (HCC): ICD-10-CM

## 2024-07-02 DIAGNOSIS — J98.2 PNEUMOMEDIASTINUM (HCC): ICD-10-CM

## 2024-07-02 PROCEDURE — G8420 CALC BMI NORM PARAMETERS: HCPCS | Performed by: INTERNAL MEDICINE

## 2024-07-02 PROCEDURE — 1123F ACP DISCUSS/DSCN MKR DOCD: CPT | Performed by: INTERNAL MEDICINE

## 2024-07-02 PROCEDURE — 1036F TOBACCO NON-USER: CPT | Performed by: INTERNAL MEDICINE

## 2024-07-02 PROCEDURE — G8427 DOCREV CUR MEDS BY ELIG CLIN: HCPCS | Performed by: INTERNAL MEDICINE

## 2024-07-02 PROCEDURE — 99205 OFFICE O/P NEW HI 60 MIN: CPT | Performed by: INTERNAL MEDICINE

## 2024-07-02 PROCEDURE — 3023F SPIROM DOC REV: CPT | Performed by: INTERNAL MEDICINE

## 2024-07-02 RX ORDER — HYDROCHLOROTHIAZIDE 12.5 MG/1
12.5 TABLET ORAL DAILY
COMMUNITY
Start: 2024-04-14

## 2024-07-02 RX ORDER — ASPIRIN 81 MG/1
81 TABLET, COATED ORAL DAILY
COMMUNITY

## 2024-07-02 RX ORDER — METHYLPREDNISOLONE 4 MG
1 TABLET, DOSE PACK ORAL
COMMUNITY

## 2024-07-02 ASSESSMENT — ENCOUNTER SYMPTOMS
SORE THROAT: 0
VOICE CHANGE: 0
CHEST TIGHTNESS: 0
COUGH: 0
RHINORRHEA: 0
WHEEZING: 0
ABDOMINAL PAIN: 0
NAUSEA: 0
DIARRHEA: 0
VOMITING: 0
SHORTNESS OF BREATH: 1
EYE ITCHING: 0

## 2024-07-02 NOTE — PROGRESS NOTES
Subjective:             Costa Ferrera is a 79 y.o. male who complains today of:     Chief Complaint   Patient presents with    New Patient     Ref by Ashleigh for emphysema. C/o weight loss        HPI  He was send her for emphysema   C/o shortness of breath  with exertion, walking long distance and climbing stairs. .   No Wheezing. C/o Cough with white  Sputum.No Hemoptysis.  No Chest tightness. No Chest pain with radiation  or pleuritic pain.No  leg edema. No orthopnea.No Fever or chills. No Rhinorrhea and postnasal drip.  C/o sneezing     He said he was 89  about 6 month ago , he has good appetite but losing weight.     He is not using any bronchodilator .   He has h/o  smoking  1ppd for 40 yrs , quit 25 yrs ago     6/18/24  IMPRESSION:  No large filling defects seen to suggest pulmonary emboli.  There are severe  emphysematous changes seen predominantly in the right upper lung fields.  Also there are areas of pneumo mediastinum seen about the upper lobe main  bronchi.  Recommend follow-up of upper lobe opacities which may reflect  pleural thickening.  Findings discussed with  on today's date at 1:20  p.m. indeterminate hypodensity is seen in the left kidney, consider  ultrasound.     Lungs/Pleura: There are opacities seen in the upper lung fields bilaterally.  Severe bullous disease is seen in the upper lobes.  There also is paraseptal  emphysematous changes.  Bronchial wall thickening is seen.  Areas of pneumo  mediastinum is seen on the posteromedial aspect of the right and left  mainstem bronchi..  No focal consolidation or pulmonary edema.  No evidence  of pleural effusion or pneumothorax.       Allergies:  Ciprofloxacin and Sulfa antibiotics  Past Medical History:   Diagnosis Date    Cancer (HCC)     Hyperlipidemia     Hypertension     IBS (irritable bowel syndrome)      Past Surgical History:   Procedure Laterality Date    SKIN CANCER EXCISION       No family history on file.  Social History

## 2024-07-03 ENCOUNTER — OFFICE VISIT (OUTPATIENT)
Dept: CARDIOTHORACIC SURGERY | Age: 79
End: 2024-07-03
Payer: MEDICARE

## 2024-07-03 VITALS
TEMPERATURE: 98.2 F | WEIGHT: 127 LBS | HEART RATE: 68 BPM | HEIGHT: 68 IN | OXYGEN SATURATION: 94 % | BODY MASS INDEX: 19.25 KG/M2

## 2024-07-03 DIAGNOSIS — R93.89 ABNORMAL COMPUTERIZED AXIAL TOMOGRAPHY OF CHEST: Primary | ICD-10-CM

## 2024-07-03 PROCEDURE — 1123F ACP DISCUSS/DSCN MKR DOCD: CPT | Performed by: THORACIC SURGERY (CARDIOTHORACIC VASCULAR SURGERY)

## 2024-07-03 PROCEDURE — G8420 CALC BMI NORM PARAMETERS: HCPCS | Performed by: THORACIC SURGERY (CARDIOTHORACIC VASCULAR SURGERY)

## 2024-07-03 PROCEDURE — 1036F TOBACCO NON-USER: CPT | Performed by: THORACIC SURGERY (CARDIOTHORACIC VASCULAR SURGERY)

## 2024-07-03 PROCEDURE — G8427 DOCREV CUR MEDS BY ELIG CLIN: HCPCS | Performed by: THORACIC SURGERY (CARDIOTHORACIC VASCULAR SURGERY)

## 2024-07-03 PROCEDURE — 99203 OFFICE O/P NEW LOW 30 MIN: CPT | Performed by: THORACIC SURGERY (CARDIOTHORACIC VASCULAR SURGERY)

## 2024-07-03 ASSESSMENT — ENCOUNTER SYMPTOMS
ABDOMINAL PAIN: 0
VOICE CHANGE: 0
STRIDOR: 0
SORE THROAT: 0
SHORTNESS OF BREATH: 0
TROUBLE SWALLOWING: 0
WHEEZING: 0
COUGH: 0
NAUSEA: 0
CHEST TIGHTNESS: 0
DIARRHEA: 0
VOMITING: 0
CHOKING: 0
ABDOMINAL DISTENTION: 0

## 2024-07-03 NOTE — PROGRESS NOTES
always the possibility that these are malignancies.  He has a mildly elevated left hemidiaphragm compared to the right.        Assessment/Plan     Apical bulla with some thickening most likely apical scarring but could be something more nefarious.  The patient already has follow-up CAT scans ordered with pulmonary medicine and I will defer to them for further follow-up.  If, by chance, any of these areas increase in size concerning for a malignancy I will refer the patient to radiation management versus surgical intervention.    Mild elevation of left hemidiaphragm compared to the right.  The patient is completely asymptomatic so I would not recommend any further workup for this.   Diagnosis Orders   1. Abnormal computerized axial tomography of chest          No orders of the defined types were placed in this encounter.    No orders of the defined types were placed in this encounter.      Return if symptoms worsen or fail to improve.      ALBERTO ROBB MD

## 2024-07-09 ENCOUNTER — APPOINTMENT (OUTPATIENT)
Dept: CT IMAGING | Age: 79
End: 2024-07-09
Payer: COMMERCIAL

## 2024-07-09 ENCOUNTER — HOSPITAL ENCOUNTER (OUTPATIENT)
Age: 79
Setting detail: OBSERVATION
Discharge: HOME HEALTH | End: 2024-07-16
Attending: EMERGENCY MEDICINE | Admitting: FAMILY MEDICINE
Payer: COMMERCIAL

## 2024-07-09 ENCOUNTER — APPOINTMENT (OUTPATIENT)
Dept: GENERAL RADIOLOGY | Age: 79
End: 2024-07-09
Payer: COMMERCIAL

## 2024-07-09 DIAGNOSIS — G45.9 TIA (TRANSIENT ISCHEMIC ATTACK): Primary | ICD-10-CM

## 2024-07-09 DIAGNOSIS — R42 VERTIGO: ICD-10-CM

## 2024-07-09 DIAGNOSIS — R29.90 STROKE-LIKE SYMPTOM: ICD-10-CM

## 2024-07-09 LAB
ALBUMIN SERPL-MCNC: 3.9 G/DL (ref 3.5–4.6)
ALP SERPL-CCNC: 68 U/L (ref 35–104)
ALT SERPL-CCNC: 12 U/L (ref 0–41)
ANION GAP SERPL CALCULATED.3IONS-SCNC: 10 MEQ/L (ref 9–15)
APTT PPP: 28 SEC (ref 24.4–36.8)
AST SERPL-CCNC: 18 U/L (ref 0–40)
BASOPHILS # BLD: 0 K/UL (ref 0–0.2)
BASOPHILS NFR BLD: 0.7 %
BILIRUB SERPL-MCNC: 0.7 MG/DL (ref 0.2–0.7)
BUN SERPL-MCNC: 20 MG/DL (ref 8–23)
CALCIUM SERPL-MCNC: 8.8 MG/DL (ref 8.5–9.9)
CHLORIDE SERPL-SCNC: 106 MEQ/L (ref 95–107)
CO2 SERPL-SCNC: 27 MEQ/L (ref 20–31)
CREAT SERPL-MCNC: 0.88 MG/DL (ref 0.7–1.2)
EOSINOPHIL # BLD: 0.3 K/UL (ref 0–0.7)
EOSINOPHIL NFR BLD: 4.9 %
ERYTHROCYTE [DISTWIDTH] IN BLOOD BY AUTOMATED COUNT: 13.3 % (ref 11.5–14.5)
GLOBULIN SER CALC-MCNC: 2.4 G/DL (ref 2.3–3.5)
GLUCOSE BLD-MCNC: 115 MG/DL (ref 70–99)
GLUCOSE SERPL-MCNC: 117 MG/DL (ref 70–99)
HCT VFR BLD AUTO: 33.3 % (ref 42–52)
HGB BLD-MCNC: 11.6 G/DL (ref 14–18)
INR PPP: 1.1
LIPASE SERPL-CCNC: 17 U/L (ref 12–95)
LYMPHOCYTES # BLD: 1.9 K/UL (ref 1–4.8)
LYMPHOCYTES NFR BLD: 33.3 %
MCH RBC QN AUTO: 32.5 PG (ref 27–31.3)
MCHC RBC AUTO-ENTMCNC: 34.8 % (ref 33–37)
MCV RBC AUTO: 93.3 FL (ref 79–92.2)
MONOCYTES # BLD: 0.6 K/UL (ref 0.2–0.8)
MONOCYTES NFR BLD: 11.3 %
NEUTROPHILS # BLD: 2.8 K/UL (ref 1.4–6.5)
NEUTS SEG NFR BLD: 49.4 %
PERFORMED ON: ABNORMAL
PLATELET # BLD AUTO: 162 K/UL (ref 130–400)
POC CREATININE WHOLE BLOOD: 1
POTASSIUM SERPL-SCNC: 3.7 MEQ/L (ref 3.4–4.9)
PROT SERPL-MCNC: 6.3 G/DL (ref 6.3–8)
PROTHROMBIN TIME: 14.8 SEC (ref 12.3–14.9)
RBC # BLD AUTO: 3.57 M/UL (ref 4.7–6.1)
SODIUM SERPL-SCNC: 143 MEQ/L (ref 135–144)
TROPONIN, HIGH SENSITIVITY: 13 NG/L (ref 0–19)
WBC # BLD AUTO: 5.7 K/UL (ref 4.8–10.8)

## 2024-07-09 PROCEDURE — 92523 SPEECH SOUND LANG COMPREHEN: CPT

## 2024-07-09 PROCEDURE — 96372 THER/PROPH/DIAG INJ SC/IM: CPT

## 2024-07-09 PROCEDURE — G0378 HOSPITAL OBSERVATION PER HR: HCPCS

## 2024-07-09 PROCEDURE — 83690 ASSAY OF LIPASE: CPT

## 2024-07-09 PROCEDURE — 6360000004 HC RX CONTRAST MEDICATION: Performed by: EMERGENCY MEDICINE

## 2024-07-09 PROCEDURE — 96374 THER/PROPH/DIAG INJ IV PUSH: CPT

## 2024-07-09 PROCEDURE — 6370000000 HC RX 637 (ALT 250 FOR IP): Performed by: FAMILY MEDICINE

## 2024-07-09 PROCEDURE — 2580000003 HC RX 258: Performed by: FAMILY MEDICINE

## 2024-07-09 PROCEDURE — 84484 ASSAY OF TROPONIN QUANT: CPT

## 2024-07-09 PROCEDURE — 99285 EMERGENCY DEPT VISIT HI MDM: CPT

## 2024-07-09 PROCEDURE — 71045 X-RAY EXAM CHEST 1 VIEW: CPT

## 2024-07-09 PROCEDURE — 85730 THROMBOPLASTIN TIME PARTIAL: CPT

## 2024-07-09 PROCEDURE — 2580000003 HC RX 258: Performed by: EMERGENCY MEDICINE

## 2024-07-09 PROCEDURE — 80053 COMPREHEN METABOLIC PANEL: CPT

## 2024-07-09 PROCEDURE — 93005 ELECTROCARDIOGRAM TRACING: CPT | Performed by: EMERGENCY MEDICINE

## 2024-07-09 PROCEDURE — 85025 COMPLETE CBC W/AUTO DIFF WBC: CPT

## 2024-07-09 PROCEDURE — 6360000002 HC RX W HCPCS: Performed by: EMERGENCY MEDICINE

## 2024-07-09 PROCEDURE — 6360000002 HC RX W HCPCS: Performed by: FAMILY MEDICINE

## 2024-07-09 PROCEDURE — 70450 CT HEAD/BRAIN W/O DYE: CPT

## 2024-07-09 PROCEDURE — 70496 CT ANGIOGRAPHY HEAD: CPT

## 2024-07-09 PROCEDURE — 36415 COLL VENOUS BLD VENIPUNCTURE: CPT

## 2024-07-09 PROCEDURE — 92610 EVALUATE SWALLOWING FUNCTION: CPT

## 2024-07-09 PROCEDURE — 97165 OT EVAL LOW COMPLEX 30 MIN: CPT

## 2024-07-09 PROCEDURE — 70498 CT ANGIOGRAPHY NECK: CPT

## 2024-07-09 PROCEDURE — 85610 PROTHROMBIN TIME: CPT

## 2024-07-09 PROCEDURE — 6370000000 HC RX 637 (ALT 250 FOR IP): Performed by: EMERGENCY MEDICINE

## 2024-07-09 RX ORDER — SODIUM CHLORIDE 0.9 % (FLUSH) 0.9 %
5-40 SYRINGE (ML) INJECTION PRN
Status: DISCONTINUED | OUTPATIENT
Start: 2024-07-09 | End: 2024-07-16 | Stop reason: HOSPADM

## 2024-07-09 RX ORDER — LABETALOL 20 MG/4 ML (5 MG/ML) INTRAVENOUS SYRINGE
10 EVERY 10 MIN PRN
Status: DISCONTINUED | OUTPATIENT
Start: 2024-07-09 | End: 2024-07-16 | Stop reason: HOSPADM

## 2024-07-09 RX ORDER — ONDANSETRON 2 MG/ML
4 INJECTION INTRAMUSCULAR; INTRAVENOUS ONCE
Status: COMPLETED | OUTPATIENT
Start: 2024-07-09 | End: 2024-07-09

## 2024-07-09 RX ORDER — MECLIZINE HYDROCHLORIDE 25 MG/1
25 TABLET ORAL 3 TIMES DAILY PRN
Status: DISCONTINUED | OUTPATIENT
Start: 2024-07-09 | End: 2024-07-16 | Stop reason: HOSPADM

## 2024-07-09 RX ORDER — MULTIVIT-MIN/IRON/FOLIC ACID/K 18-600-40
1000 CAPSULE ORAL 2 TIMES DAILY
COMMUNITY

## 2024-07-09 RX ORDER — ATORVASTATIN CALCIUM 80 MG/1
80 TABLET, FILM COATED ORAL NIGHTLY
Status: DISCONTINUED | OUTPATIENT
Start: 2024-07-09 | End: 2024-07-10

## 2024-07-09 RX ORDER — 0.9 % SODIUM CHLORIDE 0.9 %
500 INTRAVENOUS SOLUTION INTRAVENOUS ONCE
Status: COMPLETED | OUTPATIENT
Start: 2024-07-09 | End: 2024-07-09

## 2024-07-09 RX ORDER — SODIUM CHLORIDE 0.9 % (FLUSH) 0.9 %
5-40 SYRINGE (ML) INJECTION EVERY 12 HOURS SCHEDULED
Status: DISCONTINUED | OUTPATIENT
Start: 2024-07-09 | End: 2024-07-16 | Stop reason: HOSPADM

## 2024-07-09 RX ORDER — POLYETHYLENE GLYCOL 3350 17 G/17G
17 POWDER, FOR SOLUTION ORAL DAILY PRN
Status: DISCONTINUED | OUTPATIENT
Start: 2024-07-09 | End: 2024-07-16 | Stop reason: HOSPADM

## 2024-07-09 RX ORDER — SODIUM CHLORIDE 9 MG/ML
INJECTION, SOLUTION INTRAVENOUS PRN
Status: DISCONTINUED | OUTPATIENT
Start: 2024-07-09 | End: 2024-07-16 | Stop reason: HOSPADM

## 2024-07-09 RX ORDER — ONDANSETRON 4 MG/1
4 TABLET, ORALLY DISINTEGRATING ORAL EVERY 8 HOURS PRN
Status: DISCONTINUED | OUTPATIENT
Start: 2024-07-09 | End: 2024-07-16 | Stop reason: HOSPADM

## 2024-07-09 RX ORDER — ENOXAPARIN SODIUM 100 MG/ML
40 INJECTION SUBCUTANEOUS DAILY
Status: DISCONTINUED | OUTPATIENT
Start: 2024-07-09 | End: 2024-07-16 | Stop reason: HOSPADM

## 2024-07-09 RX ORDER — ONDANSETRON 2 MG/ML
4 INJECTION INTRAMUSCULAR; INTRAVENOUS EVERY 6 HOURS PRN
Status: DISCONTINUED | OUTPATIENT
Start: 2024-07-09 | End: 2024-07-16 | Stop reason: HOSPADM

## 2024-07-09 RX ORDER — ASPIRIN 81 MG/1
81 TABLET, CHEWABLE ORAL DAILY
Status: DISCONTINUED | OUTPATIENT
Start: 2024-07-09 | End: 2024-07-16 | Stop reason: HOSPADM

## 2024-07-09 RX ORDER — LISINOPRIL 5 MG/1
5 TABLET ORAL DAILY
COMMUNITY

## 2024-07-09 RX ORDER — ASPIRIN 300 MG/1
300 SUPPOSITORY RECTAL DAILY
Status: DISCONTINUED | OUTPATIENT
Start: 2024-07-09 | End: 2024-07-16 | Stop reason: HOSPADM

## 2024-07-09 RX ORDER — MECLIZINE HCL 12.5 MG/1
12.5 TABLET ORAL ONCE
Status: COMPLETED | OUTPATIENT
Start: 2024-07-09 | End: 2024-07-09

## 2024-07-09 RX ADMIN — ONDANSETRON 4 MG: 2 INJECTION INTRAMUSCULAR; INTRAVENOUS at 06:46

## 2024-07-09 RX ADMIN — MECLIZINE 12.5 MG: 12.5 TABLET ORAL at 06:48

## 2024-07-09 RX ADMIN — Medication 10 ML: at 21:10

## 2024-07-09 RX ADMIN — IOPAMIDOL 75 ML: 755 INJECTION, SOLUTION INTRAVENOUS at 06:22

## 2024-07-09 RX ADMIN — ENOXAPARIN SODIUM 40 MG: 100 INJECTION SUBCUTANEOUS at 09:03

## 2024-07-09 RX ADMIN — ATORVASTATIN CALCIUM 80 MG: 80 TABLET, FILM COATED ORAL at 21:09

## 2024-07-09 RX ADMIN — SODIUM CHLORIDE 500 ML: 9 INJECTION, SOLUTION INTRAVENOUS at 06:45

## 2024-07-09 RX ADMIN — ASPIRIN 81 MG CHEWABLE TABLET 81 MG: 81 TABLET CHEWABLE at 09:02

## 2024-07-09 ASSESSMENT — PAIN - FUNCTIONAL ASSESSMENT: PAIN_FUNCTIONAL_ASSESSMENT: NONE - DENIES PAIN

## 2024-07-09 ASSESSMENT — LIFESTYLE VARIABLES
HOW OFTEN DO YOU HAVE A DRINK CONTAINING ALCOHOL: NEVER
HOW MANY STANDARD DRINKS CONTAINING ALCOHOL DO YOU HAVE ON A TYPICAL DAY: PATIENT DOES NOT DRINK

## 2024-07-09 ASSESSMENT — PAIN SCALES - GENERAL: PAINLEVEL_OUTOF10: 0

## 2024-07-09 NOTE — H&P
CO2 27   BUN 20   CREATININE 0.88   GLUCOSE 117*   AST 18   ALT 12   BILITOT 0.7   ALKPHOS 68       INR:   Recent Labs     07/09/24  0614   INR 1.1       -----------------------------------------------------------------   XR CHEST PORTABLE    Result Date: 7/9/2024  EXAMINATION: ONE XRAY VIEW OF THE CHEST 7/9/2024 6:27 am COMPARISON: January 27, 2024 HISTORY: ORDERING SYSTEM PROVIDED HISTORY: code BAT TECHNOLOGIST PROVIDED HISTORY: Reason for exam:->code BAT What reading provider will be dictating this exam?->CRC FINDINGS: Normal cardiomediastinal silhouette. Lungs clear.  There is hyperinflation of the lungs with flattening of the diaphragms suggesting COPD.    No pneumothorax or effusion. Body wall soft tissues unremarkable. Osseous thorax intact.     1. No acute cardiopulmonary process. 2. Findings suggesting COPD.     CT HEAD WO CONTRAST    Result Date: 7/9/2024  EXAMINATION: CT OF THE HEAD WITHOUT CONTRAST  7/9/2024 6:14 am TECHNIQUE: CT of the head was performed without the administration of intravenous contrast. Automated exposure control, iterative reconstruction, and/or weight based adjustment of the mA/kV was utilized to reduce the radiation dose to as low as reasonably achievable. COMPARISON: June 18, 2024 HISTORY: ORDERING SYSTEM PROVIDED HISTORY: Stroke TECHNOLOGIST PROVIDED HISTORY: Has a \"code stroke\" or \"stroke alert\" been called?->Yes Reason for exam:->Stroke Decision Support Exception - unselect if not a suspected or confirmed emergency medical condition->Emergency Medical Condition (MA) What reading provider will be dictating this exam?->CRC FINDINGS: BRAIN/VENTRICLES: There is no acute intracranial hemorrhage, mass effect or midline shift.  No abnormal extra-axial fluid collection.  The gray-white differentiation is maintained without evidence of an acute infarct.  There is no evidence of hydrocephalus. Atrophy and periventricular white matter degenerative change. ORBITS: The visualized portion

## 2024-07-09 NOTE — ED NOTES
Swallow screen completed by night shift, reassessed by myself at this time and the patient did not have any coughing or chocking on 3 ounces of water. Coffee provided at this time.

## 2024-07-09 NOTE — ED NOTES
Patient sitting up in the bed, lights on, call light with in reach, respirations even and unlabored, bed plugged in for the patient to manage his bed position, and the T.V.

## 2024-07-09 NOTE — ED TRIAGE NOTES
Pt to ER per EMS with C/O dizziness and right hand tingling. Reports woke up sometime this am with these symptoms and fell back to sleep. Woke up at 0500 and symptoms where still there. Last known well was 1900 yesterday when pt went to bed. On arrival pt alert and oriented x 4 et continues to C/O of dizziness and right arm tingling. Reports feeling woozy.

## 2024-07-09 NOTE — ED NOTES
Patient requested that I reach out to his friend Antony, the patient is aware and will come and see him after work at 1500.

## 2024-07-09 NOTE — ACP (ADVANCE CARE PLANNING)
Advance Care Planning     Advance Care Planning Activator (Inpatient)  Conversation Note      Date of ACP Conversation: 7/9/2024     Conversation Conducted with: Patient with Decision Making Capacity    ACP Activator: Jania Mukherjee, RN        Health Care Decision Maker:     Current Designated Health Care Decision Maker:     Primary Decision Maker: Yuliana Zhou - Eivloz - [phone]

## 2024-07-09 NOTE — ED PROVIDER NOTES
[facility] 2W ORTHO TELE  EMERGENCY DEPARTMENT ENCOUNTER      Pt Name: Costa Ferrera  MRN: [id number]  Birthdate 1945  Date of evaluation: 7/9/2024  Provider: Joel Erickson MD  7:01 PM    CHIEF COMPLAINT       Chief Complaint   Patient presents with    Dizziness    Extremity Weakness         HISTORY OF PRESENT ILLNESS    Costa Ferrera is a 79 y.o. male who presents to the emergency department for evaluation via EMS.  EMS states that they were called out to the home because the patient was dizzy and complaining of right arm numbness.  Their stroke scale was negative.  Patient states that he had a dry mouth and felt tired last night when he went to bed at 1900.  That he woke up at 0500 and noticed immediately upon waking up that he was dizzy, nausea, with diaphoresis the room was spinning and he had numbness to his right arm.  He states that he continues to feel dizzy and currently has tingling to his right upper extremity.  Denies anticoagulation use or head injury, fever, headache or double vision, neck or jaw pain, chest pain or difficulty breathing, abdominal pain, numbness or weakness to lower extremities.    HPI  Chart review notes history of hypertension, hyperlipidemia, emphysema, IBS, daily aspirin use  Nursing Notes were reviewed.    REVIEW OF SYSTEMS       Review of Systems   Constitutional:  Negative for fever.   HENT:          Dry mouth   Eyes:  Negative for visual disturbance.   Respiratory:  Negative for shortness of breath.    Cardiovascular:  Negative for chest pain.   Gastrointestinal:  Negative for abdominal pain and vomiting.   Genitourinary:  Negative for flank pain.   Musculoskeletal:  Negative for neck pain.   Neurological:  Positive for dizziness. Negative for syncope, facial asymmetry, speech difficulty and weakness.        Right upper extremity paresthesia   Psychiatric/Behavioral:  Negative for confusion.    All other systems reviewed and are negative.      Except as noted above the

## 2024-07-09 NOTE — CARE COORDINATION
Case Management Assessment  Initial Evaluation    Date/Time of Evaluation: 7/9/2024 11:56 AM  Assessment Completed by: Jania Mukherjee RN    If patient is discharged prior to next notation, then this note serves as note for discharge by case management.    Patient Name: Costa Ferrera                   YOB: 1945  Diagnosis: TIA (transient ischemic attack) [G45.9]                   Date / Time: 7/9/2024  5:57 AM    Patient Admission Status: Observation   Readmission Risk (Low < 19, Mod (19-27), High > 27): No data recorded  Current PCP: Pham Sotelo, BECCA - CNP  PCP verified by CM? (P) Yes    Chart Reviewed: Yes      History Provided by: (P) Patient  Patient Orientation: (P) Alert and Oriented, Person, Place, Situation, Self    Patient Cognition: (P) Alert    Hospitalization in the last 30 days (Readmission):  No    If yes, Readmission Assessment in CM Navigator will be completed.    Advance Directives:      Code Status: Full Code   Patient's Primary Decision Maker is: (P) Legal Next of Kin (pt names his friend Yuliana.)      Discharge Planning:    Patient lives with: (P) Alone Type of Home: (P) Apartment  Primary Care Giver: (P) Self  Patient Support Systems include: (P) Friends/Neighbors   Current Financial resources: (P) Medicaid, Medicare  Current community resources: (P) None  Current services prior to admission: (P) None            Current DME:              Type of Home Care services:  (P) None    ADLS  Prior functional level: (P) Independent in ADLs/IADLs  Current functional level: (P) Independent in ADLs/IADLs    PT AM-PAC:   /24  OT AM-PAC:   /24    Family can provide assistance at DC: (P) Other (comment) (friends supportive.)  Would you like Case Management to discuss the discharge plan with any other family members/significant others, and if so, who? (P) Yes (friends Antony and Yuliana if needed.)  Plans to Return to Present Housing: (P) Yes  Other Identified Issues/Barriers to RETURNING

## 2024-07-09 NOTE — ED NOTES
Patient sitting up in the bed with the lights on, call light with in reach, awake and respirations even and unlabored. Patient requesting his wallet, and keys and glasses.

## 2024-07-10 ENCOUNTER — APPOINTMENT (OUTPATIENT)
Age: 79
End: 2024-07-10
Attending: FAMILY MEDICINE
Payer: COMMERCIAL

## 2024-07-10 LAB
CHOLEST SERPL-MCNC: 131 MG/DL (ref 0–199)
ECHO AO ROOT DIAM: 3.2 CM
ECHO AO ROOT INDEX: 1.81 CM/M2
ECHO AV AREA PEAK VELOCITY: 2 CM2
ECHO AV AREA VTI: 1.9 CM2
ECHO AV AREA/BSA PEAK VELOCITY: 1.1 CM2/M2
ECHO AV AREA/BSA VTI: 1.1 CM2/M2
ECHO AV CUSP MM: 2.1 CM
ECHO AV MEAN GRADIENT: 3 MMHG
ECHO AV MEAN VELOCITY: 0.8 M/S
ECHO AV PEAK GRADIENT: 7 MMHG
ECHO AV PEAK VELOCITY: 1.3 M/S
ECHO AV VELOCITY RATIO: 0.62
ECHO AV VTI: 33.6 CM
ECHO BSA: 1.76 M2
ECHO LA DIAMETER INDEX: 2.09 CM/M2
ECHO LA DIAMETER: 3.7 CM
ECHO LA TO AORTIC ROOT RATIO: 1.16
ECHO LA VOL A-L A2C: 46 ML (ref 18–58)
ECHO LA VOL A-L A4C: 36 ML (ref 18–58)
ECHO LA VOL MOD A2C: 43 ML (ref 18–58)
ECHO LA VOL MOD A4C: 36 ML (ref 18–58)
ECHO LA VOLUME AREA LENGTH: 44 ML
ECHO LA VOLUME INDEX A-L A2C: 26 ML/M2 (ref 16–34)
ECHO LA VOLUME INDEX A-L A4C: 20 ML/M2 (ref 16–34)
ECHO LA VOLUME INDEX AREA LENGTH: 25 ML/M2 (ref 16–34)
ECHO LA VOLUME INDEX MOD A2C: 24 ML/M2 (ref 16–34)
ECHO LA VOLUME INDEX MOD A4C: 20 ML/M2 (ref 16–34)
ECHO LV E' LATERAL VELOCITY: 11 CM/S
ECHO LV E' SEPTAL VELOCITY: 9 CM/S
ECHO LV EDV A2C: 44 ML
ECHO LV EDV A4C: 55 ML
ECHO LV EDV BP: 51 ML (ref 67–155)
ECHO LV EDV INDEX A4C: 31 ML/M2
ECHO LV EDV INDEX BP: 29 ML/M2
ECHO LV EDV NDEX A2C: 25 ML/M2
ECHO LV EJECTION FRACTION A2C: 68 %
ECHO LV EJECTION FRACTION A4C: 52 %
ECHO LV EJECTION FRACTION BIPLANE: 62 % (ref 55–100)
ECHO LV ESV A2C: 14 ML
ECHO LV ESV A4C: 26 ML
ECHO LV ESV BP: 19 ML (ref 22–58)
ECHO LV ESV INDEX A2C: 8 ML/M2
ECHO LV ESV INDEX A4C: 15 ML/M2
ECHO LV ESV INDEX BP: 11 ML/M2
ECHO LV FRACTIONAL SHORTENING: 43 % (ref 28–44)
ECHO LV INTERNAL DIMENSION DIASTOLE INDEX: 2.49 CM/M2
ECHO LV INTERNAL DIMENSION DIASTOLIC: 4.4 CM (ref 4.2–5.9)
ECHO LV INTERNAL DIMENSION SYSTOLIC INDEX: 1.41 CM/M2
ECHO LV INTERNAL DIMENSION SYSTOLIC: 2.5 CM
ECHO LV IVSD: 1.1 CM (ref 0.6–1)
ECHO LV IVSS: 1.5 CM
ECHO LV MASS 2D: 158.2 G (ref 88–224)
ECHO LV MASS INDEX 2D: 89.4 G/M2 (ref 49–115)
ECHO LV POSTERIOR WALL DIASTOLIC: 1 CM (ref 0.6–1)
ECHO LV POSTERIOR WALL SYSTOLIC: 2 CM
ECHO LV RELATIVE WALL THICKNESS RATIO: 0.45
ECHO LVOT AREA: 3.5 CM2
ECHO LVOT AV VTI INDEX: 0.57
ECHO LVOT DIAM: 2.1 CM
ECHO LVOT MEAN GRADIENT: 1 MMHG
ECHO LVOT PEAK GRADIENT: 2 MMHG
ECHO LVOT PEAK VELOCITY: 0.8 M/S
ECHO LVOT STROKE VOLUME INDEX: 37.7 ML/M2
ECHO LVOT SV: 66.8 ML
ECHO LVOT VTI: 19.3 CM
ECHO MV A VELOCITY: 0.68 M/S
ECHO MV E DECELERATION TIME (DT): 239.7 MS
ECHO MV E VELOCITY: 0.66 M/S
ECHO MV E/A RATIO: 0.97
ECHO MV E/E' LATERAL: 6
ECHO MV E/E' RATIO (AVERAGED): 6.67
ECHO MV E/E' SEPTAL: 7.33
ECHO PULMONARY ARTERY END DIASTOLIC PRESSURE: 3 MMHG
ECHO PV MAX VELOCITY: 1.5 M/S
ECHO PV PEAK GRADIENT: 9 MMHG
ECHO PV REGURGITANT MAX VELOCITY: 0.9 M/S
ECHO RV INTERNAL DIMENSION: 3.6 CM
ECHO RV TAPSE: 2.7 CM (ref 1.7–?)
ECHO TV REGURGITANT MAX VELOCITY: 2.09 M/S
ECHO TV REGURGITANT PEAK GRADIENT: 17 MMHG
EKG ATRIAL RATE: 56 BPM
EKG P AXIS: 64 DEGREES
EKG P-R INTERVAL: 124 MS
EKG Q-T INTERVAL: 436 MS
EKG QRS DURATION: 86 MS
EKG QTC CALCULATION (BAZETT): 420 MS
EKG R AXIS: 9 DEGREES
EKG T AXIS: 98 DEGREES
EKG VENTRICULAR RATE: 56 BPM
ERYTHROCYTE [DISTWIDTH] IN BLOOD BY AUTOMATED COUNT: 13.4 % (ref 11.5–14.5)
ESTIMATED AVERAGE GLUCOSE: 120 MG/DL
HBA1C MFR BLD: 5.8 % (ref 4–6)
HCT VFR BLD AUTO: 31.7 % (ref 42–52)
HDLC SERPL-MCNC: 51 MG/DL (ref 40–59)
HGB BLD-MCNC: 10.5 G/DL (ref 14–18)
LDLC SERPL CALC-MCNC: 67 MG/DL (ref 0–129)
MCH RBC QN AUTO: 31.2 PG (ref 27–31.3)
MCHC RBC AUTO-ENTMCNC: 33.1 % (ref 33–37)
MCV RBC AUTO: 94.1 FL (ref 79–92.2)
PERFORMED ON: NORMAL
PLATELET # BLD AUTO: 157 K/UL (ref 130–400)
POC CREATININE: 1 MG/DL (ref 0.8–1.3)
POC SAMPLE TYPE: NORMAL
RBC # BLD AUTO: 3.37 M/UL (ref 4.7–6.1)
TRIGL SERPL-MCNC: 63 MG/DL (ref 0–150)
WBC # BLD AUTO: 5.8 K/UL (ref 4.8–10.8)

## 2024-07-10 PROCEDURE — 93306 TTE W/DOPPLER COMPLETE: CPT

## 2024-07-10 PROCEDURE — 36415 COLL VENOUS BLD VENIPUNCTURE: CPT

## 2024-07-10 PROCEDURE — 93306 TTE W/DOPPLER COMPLETE: CPT | Performed by: INTERNAL MEDICINE

## 2024-07-10 PROCEDURE — 96372 THER/PROPH/DIAG INJ SC/IM: CPT

## 2024-07-10 PROCEDURE — G0378 HOSPITAL OBSERVATION PER HR: HCPCS

## 2024-07-10 PROCEDURE — 6360000002 HC RX W HCPCS: Performed by: FAMILY MEDICINE

## 2024-07-10 PROCEDURE — 6370000000 HC RX 637 (ALT 250 FOR IP): Performed by: FAMILY MEDICINE

## 2024-07-10 PROCEDURE — 99223 1ST HOSP IP/OBS HIGH 75: CPT | Performed by: PSYCHIATRY & NEUROLOGY

## 2024-07-10 PROCEDURE — 80061 LIPID PANEL: CPT

## 2024-07-10 PROCEDURE — 97162 PT EVAL MOD COMPLEX 30 MIN: CPT

## 2024-07-10 PROCEDURE — 83036 HEMOGLOBIN GLYCOSYLATED A1C: CPT

## 2024-07-10 PROCEDURE — APPSS45 APP SPLIT SHARED TIME 31-45 MINUTES: Performed by: NURSE PRACTITIONER

## 2024-07-10 PROCEDURE — 94640 AIRWAY INHALATION TREATMENT: CPT

## 2024-07-10 PROCEDURE — 2580000003 HC RX 258: Performed by: FAMILY MEDICINE

## 2024-07-10 PROCEDURE — 94761 N-INVAS EAR/PLS OXIMETRY MLT: CPT

## 2024-07-10 PROCEDURE — 85027 COMPLETE CBC AUTOMATED: CPT

## 2024-07-10 RX ORDER — LISINOPRIL 5 MG/1
5 TABLET ORAL DAILY
Status: DISCONTINUED | OUTPATIENT
Start: 2024-07-10 | End: 2024-07-16 | Stop reason: HOSPADM

## 2024-07-10 RX ORDER — HYDROCHLOROTHIAZIDE 25 MG/1
12.5 TABLET ORAL DAILY
Status: DISCONTINUED | OUTPATIENT
Start: 2024-07-10 | End: 2024-07-16 | Stop reason: HOSPADM

## 2024-07-10 RX ORDER — LORAZEPAM 2 MG/ML
1 INJECTION INTRAMUSCULAR ONCE
Status: COMPLETED | OUTPATIENT
Start: 2024-07-10 | End: 2024-07-11

## 2024-07-10 RX ORDER — BUDESONIDE AND FORMOTEROL FUMARATE DIHYDRATE 160; 4.5 UG/1; UG/1
2 AEROSOL RESPIRATORY (INHALATION)
Status: DISCONTINUED | OUTPATIENT
Start: 2024-07-10 | End: 2024-07-16 | Stop reason: HOSPADM

## 2024-07-10 RX ORDER — ASPIRIN 81 MG/1
81 TABLET ORAL DAILY
Status: DISCONTINUED | OUTPATIENT
Start: 2024-07-10 | End: 2024-07-10

## 2024-07-10 RX ORDER — PRAVASTATIN SODIUM 10 MG
20 TABLET ORAL DAILY
Status: DISCONTINUED | OUTPATIENT
Start: 2024-07-10 | End: 2024-07-16 | Stop reason: HOSPADM

## 2024-07-10 RX ADMIN — BUDESONIDE AND FORMOTEROL FUMARATE DIHYDRATE 2 PUFF: 160; 4.5 AEROSOL RESPIRATORY (INHALATION) at 19:56

## 2024-07-10 RX ADMIN — ENOXAPARIN SODIUM 40 MG: 100 INJECTION SUBCUTANEOUS at 08:59

## 2024-07-10 RX ADMIN — BUDESONIDE AND FORMOTEROL FUMARATE DIHYDRATE 2 PUFF: 160; 4.5 AEROSOL RESPIRATORY (INHALATION) at 08:34

## 2024-07-10 RX ADMIN — PRAVASTATIN SODIUM 20 MG: 10 TABLET ORAL at 20:50

## 2024-07-10 RX ADMIN — HYDROCHLOROTHIAZIDE 12.5 MG: 25 TABLET ORAL at 08:58

## 2024-07-10 RX ADMIN — Medication 10 ML: at 20:51

## 2024-07-10 RX ADMIN — LISINOPRIL 30 MG: 20 TABLET ORAL at 08:58

## 2024-07-10 RX ADMIN — PSYLLIUM HUSK 1 PACKET: 3.4 POWDER ORAL at 08:59

## 2024-07-10 RX ADMIN — ASPIRIN 81 MG CHEWABLE TABLET 81 MG: 81 TABLET CHEWABLE at 08:58

## 2024-07-10 RX ADMIN — TIOTROPIUM BROMIDE INHALATION SPRAY 2 PUFF: 3.12 SPRAY, METERED RESPIRATORY (INHALATION) at 08:34

## 2024-07-10 RX ADMIN — Medication 10 ML: at 09:01

## 2024-07-10 RX ADMIN — LISINOPRIL 5 MG: 5 TABLET ORAL at 09:00

## 2024-07-10 NOTE — CARE COORDINATION
Case Management Assessment  Initial Evaluation    Date/Time of Evaluation: 7/10/2024 11:59 AM  Assessment Completed by: NATTY Lopez    If patient is discharged prior to next notation, then this note serves as note for discharge by case management.    Patient Name: Costa Ferrera                   YOB: 1945  Diagnosis: TIA (transient ischemic attack) [G45.9]                   Date / Time: 7/9/2024  5:57 AM    Patient Admission Status: Observation   Readmission Risk (Low < 19, Mod (19-27), High > 27): No data recorded  Current PCP: Pham Sotelo APRN - CNP  PCP verified by CM? Yes    Chart Reviewed: Yes      History Provided by: Patient, Friend  Patient Orientation: Alert and Oriented, Person, Place    Patient Cognition: Alert    Hospitalization in the last 30 days (Readmission):  No    If yes, Readmission Assessment in  Navigator will be completed.    Advance Directives:      Code Status: Full Code   Patient's Primary Decision Maker is: Legal Next of Kin (pt names his friend Yuliana.)    Primary Decision Maker: Yuliana Zhou - Friend - [phone]    Discharge Planning:    Patient lives with: Alone Type of Home: Skilled Nursing Facility  Primary Care Giver: Self  Patient Support Systems include: Friends/Neighbors   Current Financial resources: Medicaid, Medicare  Current community resources: None  Current services prior to admission: None            Current DME:              Type of Home Care services:  None    ADLS  Prior functional level: Independent in ADLs/IADLs  Current functional level: Assistance with the following:, Cooking, Housework, Shopping, Mobility    PT AM-PAC: 18 /24  OT AM-PAC: 24 /24    Family can provide assistance at DC: No  Would you like Case Management to discuss the discharge plan with any other family members/significant others, and if so, who? Yes (friend Yuliana)  Plans to Return to Present Housing: Unknown at present  Other Identified Issues/Barriers to

## 2024-07-10 NOTE — CARE COORDINATION
NATTY SPOKE WITH PT'S CASEMANAGER SKYLER AT [facility].  HER NUMBER -[phone].  SHE HAS SCHEDULED THIS PT FOR AN ASSISTED LIVING ASSESSMENT TO START THE MEDICAID WAIVER PROGRAM.  SHE WILL BE OUT TO SEE HIM ON TUESDAY 8/20/24.

## 2024-07-10 NOTE — CONSULTS
medical condition->Emergency Medical Condition (MA)  What reading provider will be dictating this exam?->CRC    FINDINGS:  BRAIN/VENTRICLES: There is no acute intracranial hemorrhage, mass effect or  midline shift.  No abnormal extra-axial fluid collection.  The gray-white  differentiation is maintained without evidence of an acute infarct.  There is  no evidence of hydrocephalus. Atrophy and periventricular white matter  degenerative change.    ORBITS: The visualized portion of the orbits demonstrate no acute abnormality.    SINUSES: The visualized paranasal sinuses and mastoid air cells demonstrate  no acute abnormality.    SOFT TISSUES/SKULL:  No acute abnormality of the visualized skull or soft  tissues.    Impression  No acute intracranial abnormality.  No results found for this or any previous visit.  No results found for this or any previous visit.      Carotid duplex: No results found for this or any previous visit.  No results found for this or any previous visit.  No results found for this or any previous visit.      Echo No results found for this or any previous visit.            Assessment/Plan:    Patient is a 79-year-old male with past medical history of IBS, hypertension, hyperlipidemia, skin cancer who presented to the [facility] emergency room on 7/9/2024 via EMS with complaints of dizziness and right hand tingling.  Patient awoke with symptoms.  Patient reports feeling fatigued and with dry mouth when going to bed at 7 PM the night prior.  Patient reports when he awoke in the morning he was extremely diaphoretic.  NIH score 0.  Documented blood pressure 161/67.  No hypoglycemia or hypoxia.  EKG showed sinus bradycardia at 56 bpm.  Chest x-ray negative for acute findings.  CT of the head negative for acute findings CTA of the head and neck negative.  Lab testing remarkable for hemoglobin of 11.6.    Patient with ongoing vertigo and gait ataxia.  Right hand paresthesia improved.  Will order MRI of

## 2024-07-10 NOTE — ACP (ADVANCE CARE PLANNING)
Advance Care Planning   Healthcare Decision Maker:    Primary Decision Maker: Yuliana Zhou - Nick - [phone]    Click here to complete Healthcare Decision Makers including selection of the Healthcare Decision Maker Relationship (ie \"Primary\").  Today we documented Decision Maker(s) consistent with ACP documents on file.       If the relationship to the patient does NOT follow our state's Next of Kin hierarchy, the patient MUST complete an ACP Document to allow him/her to act on the patient's behalf. :

## 2024-07-11 ENCOUNTER — APPOINTMENT (OUTPATIENT)
Dept: MRI IMAGING | Age: 79
End: 2024-07-11
Payer: COMMERCIAL

## 2024-07-11 PROCEDURE — 94640 AIRWAY INHALATION TREATMENT: CPT

## 2024-07-11 PROCEDURE — G0378 HOSPITAL OBSERVATION PER HR: HCPCS

## 2024-07-11 PROCEDURE — 97130 THER IVNTJ EA ADDL 15 MIN: CPT

## 2024-07-11 PROCEDURE — 6370000000 HC RX 637 (ALT 250 FOR IP): Performed by: FAMILY MEDICINE

## 2024-07-11 PROCEDURE — 97129 THER IVNTJ 1ST 15 MIN: CPT

## 2024-07-11 PROCEDURE — 6360000002 HC RX W HCPCS: Performed by: FAMILY MEDICINE

## 2024-07-11 PROCEDURE — 99232 SBSQ HOSP IP/OBS MODERATE 35: CPT | Performed by: PSYCHIATRY & NEUROLOGY

## 2024-07-11 PROCEDURE — 96372 THER/PROPH/DIAG INJ SC/IM: CPT

## 2024-07-11 PROCEDURE — 94761 N-INVAS EAR/PLS OXIMETRY MLT: CPT

## 2024-07-11 PROCEDURE — 70551 MRI BRAIN STEM W/O DYE: CPT

## 2024-07-11 PROCEDURE — APPSS15 APP SPLIT SHARED TIME 0-15 MINUTES: Performed by: NURSE PRACTITIONER

## 2024-07-11 PROCEDURE — 2580000003 HC RX 258: Performed by: FAMILY MEDICINE

## 2024-07-11 PROCEDURE — 97116 GAIT TRAINING THERAPY: CPT

## 2024-07-11 PROCEDURE — 6360000002 HC RX W HCPCS: Performed by: NURSE PRACTITIONER

## 2024-07-11 PROCEDURE — 96375 TX/PRO/DX INJ NEW DRUG ADDON: CPT

## 2024-07-11 RX ORDER — MECLIZINE HCL 12.5 MG/1
12.5 TABLET ORAL 3 TIMES DAILY
Qty: 21 TABLET | Refills: 0 | Status: SHIPPED | OUTPATIENT
Start: 2024-07-11 | End: 2024-07-18

## 2024-07-11 RX ADMIN — BUDESONIDE AND FORMOTEROL FUMARATE DIHYDRATE 2 PUFF: 160; 4.5 AEROSOL RESPIRATORY (INHALATION) at 07:55

## 2024-07-11 RX ADMIN — PRAVASTATIN SODIUM 20 MG: 10 TABLET ORAL at 20:55

## 2024-07-11 RX ADMIN — TIOTROPIUM BROMIDE INHALATION SPRAY 2 PUFF: 3.12 SPRAY, METERED RESPIRATORY (INHALATION) at 07:55

## 2024-07-11 RX ADMIN — ENOXAPARIN SODIUM 40 MG: 100 INJECTION SUBCUTANEOUS at 10:20

## 2024-07-11 RX ADMIN — LISINOPRIL 5 MG: 5 TABLET ORAL at 10:22

## 2024-07-11 RX ADMIN — Medication 10 ML: at 20:55

## 2024-07-11 RX ADMIN — PSYLLIUM HUSK 1 PACKET: 3.4 POWDER ORAL at 10:22

## 2024-07-11 RX ADMIN — Medication 1 MG: at 08:33

## 2024-07-11 RX ADMIN — BUDESONIDE AND FORMOTEROL FUMARATE DIHYDRATE 2 PUFF: 160; 4.5 AEROSOL RESPIRATORY (INHALATION) at 19:23

## 2024-07-11 RX ADMIN — ASPIRIN 81 MG CHEWABLE TABLET 81 MG: 81 TABLET CHEWABLE at 10:22

## 2024-07-11 RX ADMIN — HYDROCHLOROTHIAZIDE 12.5 MG: 25 TABLET ORAL at 10:21

## 2024-07-11 RX ADMIN — POLYETHYLENE GLYCOL 3350 17 G: 17 POWDER, FOR SOLUTION ORAL at 10:19

## 2024-07-11 RX ADMIN — Medication 10 ML: at 10:22

## 2024-07-11 RX ADMIN — LISINOPRIL 30 MG: 20 TABLET ORAL at 10:20

## 2024-07-11 ASSESSMENT — PAIN SCALES - GENERAL
PAINLEVEL_OUTOF10: 0

## 2024-07-11 ASSESSMENT — PAIN DESCRIPTION - LOCATION
LOCATION: BACK

## 2024-07-11 NOTE — CARE COORDINATION
NATTY SPOKE WITH JESSICA AT [facility] AND SHE WILL START THE PRECERT TODAY FOR THIS PT.

## 2024-07-11 NOTE — DISCHARGE INSTR - COC
Continuity of Care Form    Patient Name: Costa Ferrera   :  1945  MRN:  [id number]    Admit date:  2024  Discharge date:  2024    Code Status Order: Full Code   Advance Directives:     Admitting Physician:  Sammy Gentile MD  PCP: Pham Sotelo, APRN - CNP    Discharging Nurse: JEANNINE Ireland  Discharging Hospital Unit/Room#: W293/W293-01  Discharging Unit Phone Number: [phone]    Emergency Contact:   Extended Emergency Contact Information  Primary Emergency Contact: Yuliana Zhou   [facility]  Home Phone: [phone]  Relation: Friend   needed? No  Secondary Emergency Contact: Antony Johns   [facility]  Home Phone: [phone]  Relation: Friend   needed? No    Past Surgical History:  Past Surgical History:   Procedure Laterality Date    SKIN CANCER EXCISION         Immunization History:   Immunization History   Administered Date(s) Administered    COVID-19, PFIZER Bivalent, DO NOT Dilute, (age 12y+), IM, 30 mcg/0.3 mL 10/07/2022    COVID-19, PFIZER, ( formula), (age 12y+), IM, 30mcg/0.3mL 10/17/2023       Active Problems:  Patient Active Problem List   Diagnosis Code    Abnormal computerized axial tomography of chest R93.89    TIA (transient ischemic attack) G45.9       Isolation/Infection:   Isolation            No Isolation          Patient Infection Status       None to display                     Nurse Assessment:  Last Vital Signs: BP (!) 130/58   Pulse 63   Temp 97.5 °F (36.4 °C)   Resp 16   Ht 1.727 m (5' 8\")   Wt 64.7 kg (142 lb 11.2 oz)   SpO2 100%   BMI 21.70 kg/m²     Last documented pain score (0-10 scale): Pain Level: 0  Last Weight:   Wt Readings from Last 1 Encounters:   24 64.7 kg (142 lb 11.2 oz)     Mental Status:  oriented, alert, coherent, logical, thought processes intact, and able to concentrate and follow conversation    IV Access:  - None    Nursing Mobility/ADLs:  Walking   Independent  Transfer

## 2024-07-12 PROCEDURE — 99231 SBSQ HOSP IP/OBS SF/LOW 25: CPT | Performed by: PSYCHIATRY & NEUROLOGY

## 2024-07-12 PROCEDURE — 94640 AIRWAY INHALATION TREATMENT: CPT

## 2024-07-12 PROCEDURE — 94761 N-INVAS EAR/PLS OXIMETRY MLT: CPT

## 2024-07-12 PROCEDURE — APPSS15 APP SPLIT SHARED TIME 0-15 MINUTES: Performed by: NURSE PRACTITIONER

## 2024-07-12 PROCEDURE — G0378 HOSPITAL OBSERVATION PER HR: HCPCS

## 2024-07-12 PROCEDURE — 2580000003 HC RX 258: Performed by: FAMILY MEDICINE

## 2024-07-12 PROCEDURE — 6370000000 HC RX 637 (ALT 250 FOR IP): Performed by: FAMILY MEDICINE

## 2024-07-12 PROCEDURE — 97116 GAIT TRAINING THERAPY: CPT

## 2024-07-12 PROCEDURE — 6360000002 HC RX W HCPCS: Performed by: FAMILY MEDICINE

## 2024-07-12 PROCEDURE — 96372 THER/PROPH/DIAG INJ SC/IM: CPT

## 2024-07-12 RX ADMIN — HYDROCHLOROTHIAZIDE 12.5 MG: 25 TABLET ORAL at 09:33

## 2024-07-12 RX ADMIN — LISINOPRIL 30 MG: 20 TABLET ORAL at 09:32

## 2024-07-12 RX ADMIN — POLYETHYLENE GLYCOL 3350 17 G: 17 POWDER, FOR SOLUTION ORAL at 09:52

## 2024-07-12 RX ADMIN — LISINOPRIL 5 MG: 5 TABLET ORAL at 09:32

## 2024-07-12 RX ADMIN — Medication 10 ML: at 21:02

## 2024-07-12 RX ADMIN — PSYLLIUM HUSK 1 PACKET: 3.4 POWDER ORAL at 09:32

## 2024-07-12 RX ADMIN — Medication 10 ML: at 09:34

## 2024-07-12 RX ADMIN — PRAVASTATIN SODIUM 20 MG: 10 TABLET ORAL at 21:01

## 2024-07-12 RX ADMIN — BUDESONIDE AND FORMOTEROL FUMARATE DIHYDRATE 2 PUFF: 160; 4.5 AEROSOL RESPIRATORY (INHALATION) at 19:12

## 2024-07-12 RX ADMIN — TIOTROPIUM BROMIDE INHALATION SPRAY 2 PUFF: 3.12 SPRAY, METERED RESPIRATORY (INHALATION) at 07:14

## 2024-07-12 RX ADMIN — BUDESONIDE AND FORMOTEROL FUMARATE DIHYDRATE 2 PUFF: 160; 4.5 AEROSOL RESPIRATORY (INHALATION) at 07:14

## 2024-07-12 RX ADMIN — ASPIRIN 81 MG CHEWABLE TABLET 81 MG: 81 TABLET CHEWABLE at 09:33

## 2024-07-12 RX ADMIN — ENOXAPARIN SODIUM 40 MG: 100 INJECTION SUBCUTANEOUS at 09:32

## 2024-07-12 NOTE — DISCHARGE SUMMARY
Physician Discharge Summary     Patient ID:  Costa Kearneyzolivia  [id number]  79 y.o.  1945    Admit date: 7/9/2024    Discharge date : 07/12/24     Admitting Physician: Corey Pollack MD     Discharge Physician: COREY POLLACK MD     Admission Diagnoses: TIA (transient ischemic attack) [G45.9]    Discharge Diagnoses: TIA ruled out, vertigo and gait ataxia    Admission Condition: fair    Discharged Condition: good    Hospital Course:   TIA, right arm paresthesia  CT head neg, CTA head/neck prelim neg, pt/ot/st, asa, statin, neuro consult, echo.    7/10 - await MRI brain per neuro, pt/ot eval  7/11 - mri unremarkable, planning for snf when approved.  7/12 - ruled out  Hx severe vertigo  Longstanding problem, cont antivert  7/12 - likely main contributor to admission, cont antivert on dc    Consults: neurology    Significant Diagnostic Studies: as below    Discharge Exam:  BP (!) 130/55   Pulse 58   Temp 97.7 °F (36.5 °C) (Oral)   Resp 18   Ht 1.727 m (5' 8\")   Wt 64.7 kg (142 lb 11.2 oz)   SpO2 98%   BMI 21.70 kg/m²   General appearance: alert, appears stated age, and cooperative  Lungs: clear to auscultation bilaterally  Heart: regular rate and rhythm, S1, S2 normal, no murmur, click, rub or gallop  Abdomen: soft, non-tender; bowel sounds normal; no masses,  no organomegaly  Extremities: extremities normal, atraumatic, no cyanosis or edema  Skin: Skin color, texture, turgor normal. No rashes or lesions    Labs:   Recent Labs     07/10/24  0503   WBC 5.8   HGB 10.5*   HCT 31.7*        No results for input(s): \"NA\", \"K\", \"CL\", \"CO2\", \"BUN\", \"CREATININE\", \"CALCIUM\", \"PHOS\" in the last 72 hours.    Invalid input(s): \"MAGNES\"  No results for input(s): \"AST\", \"ALT\", \"BILIDIR\", \"BILITOT\", \"ALKPHOS\" in the last 72 hours.  No results for input(s): \"INR\" in the last 72 hours.  No results for input(s): \"CKTOTAL\", \"TROPONINI\" in the last 72 hours.    Urinalysis:   Lab Results   Component Value Date/Time

## 2024-07-12 NOTE — CARE COORDINATION
GISELLEW CHECKED WITH JESSICA AT [facility].  AS OF THE TIME OF THIS NOTE THERE IS NO PRECERT APPROVAL.  PASS COMPLETED FOR TRANSFER.

## 2024-07-12 NOTE — PLAN OF CARE
Problem: Discharge Planning  Goal: Discharge to home or other facility with appropriate resources  7/11/2024 2235 by Isis Quesada RN  Outcome: Progressing  7/11/2024 1241 by Marlene Ortega RN  Outcome: Progressing     Problem: Safety - Adult  Goal: Free from fall injury  7/11/2024 2235 by Isis Quesada RN  Outcome: Progressing  7/11/2024 1241 by Marlene Ortega RN  Outcome: Progressing     Problem: ABCDS Injury Assessment  Goal: Absence of physical injury  7/11/2024 2235 by Isis Quesada RN  Outcome: Progressing  7/11/2024 1241 by Marlene Ortega RN  Outcome: Progressing

## 2024-07-13 PROCEDURE — 6360000002 HC RX W HCPCS: Performed by: FAMILY MEDICINE

## 2024-07-13 PROCEDURE — 94761 N-INVAS EAR/PLS OXIMETRY MLT: CPT

## 2024-07-13 PROCEDURE — G0378 HOSPITAL OBSERVATION PER HR: HCPCS

## 2024-07-13 PROCEDURE — 94640 AIRWAY INHALATION TREATMENT: CPT

## 2024-07-13 PROCEDURE — 6370000000 HC RX 637 (ALT 250 FOR IP): Performed by: FAMILY MEDICINE

## 2024-07-13 PROCEDURE — 96372 THER/PROPH/DIAG INJ SC/IM: CPT

## 2024-07-13 PROCEDURE — 2580000003 HC RX 258: Performed by: FAMILY MEDICINE

## 2024-07-13 RX ADMIN — BUDESONIDE AND FORMOTEROL FUMARATE DIHYDRATE 2 PUFF: 160; 4.5 AEROSOL RESPIRATORY (INHALATION) at 09:29

## 2024-07-13 RX ADMIN — Medication 10 ML: at 21:16

## 2024-07-13 RX ADMIN — ASPIRIN 81 MG CHEWABLE TABLET 81 MG: 81 TABLET CHEWABLE at 08:49

## 2024-07-13 RX ADMIN — LISINOPRIL 30 MG: 20 TABLET ORAL at 08:48

## 2024-07-13 RX ADMIN — POLYETHYLENE GLYCOL 3350 17 G: 17 POWDER, FOR SOLUTION ORAL at 08:49

## 2024-07-13 RX ADMIN — TIOTROPIUM BROMIDE INHALATION SPRAY 2 PUFF: 3.12 SPRAY, METERED RESPIRATORY (INHALATION) at 09:29

## 2024-07-13 RX ADMIN — PSYLLIUM HUSK 1 PACKET: 3.4 POWDER ORAL at 08:49

## 2024-07-13 RX ADMIN — Medication 10 ML: at 08:50

## 2024-07-13 RX ADMIN — ENOXAPARIN SODIUM 40 MG: 100 INJECTION SUBCUTANEOUS at 08:49

## 2024-07-13 RX ADMIN — PRAVASTATIN SODIUM 20 MG: 10 TABLET ORAL at 21:16

## 2024-07-13 RX ADMIN — BUDESONIDE AND FORMOTEROL FUMARATE DIHYDRATE 2 PUFF: 160; 4.5 AEROSOL RESPIRATORY (INHALATION) at 20:24

## 2024-07-13 RX ADMIN — SODIUM CHLORIDE, PRESERVATIVE FREE 10 ML: 5 INJECTION INTRAVENOUS at 21:16

## 2024-07-13 RX ADMIN — HYDROCHLOROTHIAZIDE 12.5 MG: 25 TABLET ORAL at 08:49

## 2024-07-13 RX ADMIN — LISINOPRIL 5 MG: 5 TABLET ORAL at 08:48

## 2024-07-13 NOTE — PLAN OF CARE
Problem: Discharge Planning  Goal: Discharge to home or other facility with appropriate resources  7/13/2024 1145 by Marlene Ortega, RN  Outcome: Progressing     Problem: Safety - Adult  Goal: Free from fall injury  7/13/2024 1145 by Marlene Ortega RN  Outcome: Progressing     Problem: ABCDS Injury Assessment  Goal: Absence of physical injury  7/13/2024 1145 by Marlene Ortega, RN  Outcome: Progressing

## 2024-07-13 NOTE — CARE COORDINATION
DC PLAN REMAINS [facility]. [id number] DONE. CALL TO JESSICA AT [facility] AND PRECERT IS STILL PENDING.

## 2024-07-13 NOTE — CARE COORDINATION
PER DR POLLACK, P2P DENIAL UPHELD. SPOKE WITH PATIENT AGREEABLE TO [facility] FOR NURSING ONLY. FOC OFFERED AND CHOSE [facility]. CALL TO JAZMYNE AT [facility] AND WILL REVIEW REFERRAL. WILL AWAIT CALL BACK, PT FOR DC TOMORROW AND WILL NEED A RIDE HOME FROM [facility]. WILL UPDATE RN

## 2024-07-13 NOTE — CARE COORDINATION
CALL FROM [facility] AND PT WILL NEED P2P BY MONDAY 12PM. PEER TO PEER TO BE CALLED [phone] OPTION 5 WITH NAME,  AND MEMBER ID # [id number]. MESSAGE SENT TO DR POLLACK

## 2024-07-14 PROCEDURE — G0378 HOSPITAL OBSERVATION PER HR: HCPCS

## 2024-07-14 PROCEDURE — 96372 THER/PROPH/DIAG INJ SC/IM: CPT

## 2024-07-14 PROCEDURE — 6370000000 HC RX 637 (ALT 250 FOR IP): Performed by: FAMILY MEDICINE

## 2024-07-14 PROCEDURE — 94761 N-INVAS EAR/PLS OXIMETRY MLT: CPT

## 2024-07-14 PROCEDURE — 6360000002 HC RX W HCPCS: Performed by: FAMILY MEDICINE

## 2024-07-14 PROCEDURE — 94640 AIRWAY INHALATION TREATMENT: CPT

## 2024-07-14 PROCEDURE — 2580000003 HC RX 258: Performed by: FAMILY MEDICINE

## 2024-07-14 RX ADMIN — ENOXAPARIN SODIUM 40 MG: 100 INJECTION SUBCUTANEOUS at 08:53

## 2024-07-14 RX ADMIN — POLYETHYLENE GLYCOL 3350 17 G: 17 POWDER, FOR SOLUTION ORAL at 08:58

## 2024-07-14 RX ADMIN — LISINOPRIL 30 MG: 20 TABLET ORAL at 08:53

## 2024-07-14 RX ADMIN — Medication 10 ML: at 20:45

## 2024-07-14 RX ADMIN — ASPIRIN 81 MG CHEWABLE TABLET 81 MG: 81 TABLET CHEWABLE at 08:56

## 2024-07-14 RX ADMIN — Medication 10 ML: at 08:58

## 2024-07-14 RX ADMIN — ONDANSETRON 4 MG: 4 TABLET, ORALLY DISINTEGRATING ORAL at 09:11

## 2024-07-14 RX ADMIN — PRAVASTATIN SODIUM 20 MG: 10 TABLET ORAL at 20:45

## 2024-07-14 RX ADMIN — MECLIZINE HYDROCHLORIDE 25 MG: 25 TABLET ORAL at 09:10

## 2024-07-14 RX ADMIN — BUDESONIDE AND FORMOTEROL FUMARATE DIHYDRATE 2 PUFF: 160; 4.5 AEROSOL RESPIRATORY (INHALATION) at 19:53

## 2024-07-14 RX ADMIN — LISINOPRIL 5 MG: 5 TABLET ORAL at 08:57

## 2024-07-14 RX ADMIN — HYDROCHLOROTHIAZIDE 12.5 MG: 25 TABLET ORAL at 08:56

## 2024-07-14 NOTE — CARE COORDINATION
DC PLAN REMAINS HOME WITH [facility] WHICH HAS BEEN ARRANGED. PT WILL NEED LYFT RIDE AT [facility].

## 2024-07-14 NOTE — PLAN OF CARE
Problem: Discharge Planning  Goal: Discharge to home or other facility with appropriate resources  7/14/2024 1112 by Blayne Diaz RN  Outcome: Progressing  7/14/2024 0122 by Rahel Malone RN  Outcome: Adequate for Discharge  Flowsheets (Taken 7/13/2024 2130)  Discharge to home or other facility with appropriate resources: Identify barriers to discharge with patient and caregiver     Problem: Safety - Adult  Goal: Free from fall injury  7/14/2024 1112 by Blayne Diaz RN  Outcome: Progressing  7/14/2024 0122 by Rahel Malone, LORELEI  Outcome: Adequate for Discharge     Problem: ABCDS Injury Assessment  Goal: Absence of physical injury  7/14/2024 1112 by Blayne Diaz RN  Outcome: Progressing  7/14/2024 0122 by Rahel Malone RN  Outcome: Adequate for Discharge

## 2024-07-14 NOTE — PLAN OF CARE
Problem: Discharge Planning  Goal: Discharge to home or other facility with appropriate resources  7/14/2024 0122 by Rahel Malone RN  Outcome: Adequate for Discharge  Flowsheets (Taken 7/13/2024 2130)  Discharge to home or other facility with appropriate resources: Identify barriers to discharge with patient and caregiver  7/13/2024 1145 by Marlene Ortega RN  Outcome: Progressing     Problem: Safety - Adult  Goal: Free from fall injury  7/14/2024 0122 by Rahel Malone RN  Outcome: Adequate for Discharge  7/13/2024 1145 by Marlene Ortega RN  Outcome: Progressing     Problem: ABCDS Injury Assessment  Goal: Absence of physical injury  7/14/2024 0122 by Rahel Malone RN  Outcome: Adequate for Discharge  7/13/2024 1145 by Marlene Ortega RN  Outcome: Progressing

## 2024-07-15 PROCEDURE — 96372 THER/PROPH/DIAG INJ SC/IM: CPT

## 2024-07-15 PROCEDURE — 6360000002 HC RX W HCPCS: Performed by: FAMILY MEDICINE

## 2024-07-15 PROCEDURE — 97129 THER IVNTJ 1ST 15 MIN: CPT

## 2024-07-15 PROCEDURE — 2580000003 HC RX 258: Performed by: FAMILY MEDICINE

## 2024-07-15 PROCEDURE — 6370000000 HC RX 637 (ALT 250 FOR IP): Performed by: FAMILY MEDICINE

## 2024-07-15 PROCEDURE — 97116 GAIT TRAINING THERAPY: CPT

## 2024-07-15 PROCEDURE — G0378 HOSPITAL OBSERVATION PER HR: HCPCS

## 2024-07-15 RX ADMIN — Medication 10 ML: at 21:18

## 2024-07-15 RX ADMIN — LISINOPRIL 5 MG: 5 TABLET ORAL at 10:14

## 2024-07-15 RX ADMIN — ASPIRIN 81 MG CHEWABLE TABLET 81 MG: 81 TABLET CHEWABLE at 10:06

## 2024-07-15 RX ADMIN — Medication 10 ML: at 10:00

## 2024-07-15 RX ADMIN — LISINOPRIL 30 MG: 20 TABLET ORAL at 10:11

## 2024-07-15 RX ADMIN — ENOXAPARIN SODIUM 40 MG: 100 INJECTION SUBCUTANEOUS at 10:15

## 2024-07-15 RX ADMIN — PRAVASTATIN SODIUM 20 MG: 10 TABLET ORAL at 21:18

## 2024-07-15 RX ADMIN — HYDROCHLOROTHIAZIDE 12.5 MG: 25 TABLET ORAL at 10:07

## 2024-07-15 NOTE — FLOWSHEET NOTE
Pt has been up in his room most of the day. Pt has been eating all of his meals and used the bathroom Shaved his face and washed up minium assist gait is steady with a walker.  No distress noted.Electronically signed by Marcia Henderson LPN on 7/15/2024 at 2:15 PM

## 2024-07-15 NOTE — CARE COORDINATION
SPOKE WITH VINAYAK AT [facility], UPDATED. PT ACCEPTED. THEY WILL CONTINUE TO FOLLOW FOR FINAL DC PLAN.

## 2024-07-15 NOTE — CARE COORDINATION
Lsw spoke with Paige at [facility] regarding the denial for skilled care at [facility].  LSW asked if they could try for a long term care authorization to see if he can be placed at [facility] until the assisted living can be put in place.  Paige will check with her billing dept to see if that is possible.  LSW spoke with the pt's POA and she said that she has been in touch with the pt's  Senait and she will call the POA today to complete the evaluation assessment for assisted living.  LSW to follow to see which placement can happen for this pt.

## 2024-07-16 VITALS
TEMPERATURE: 98.2 F | RESPIRATION RATE: 14 BRPM | WEIGHT: 126.1 LBS | BODY MASS INDEX: 19.11 KG/M2 | DIASTOLIC BLOOD PRESSURE: 72 MMHG | OXYGEN SATURATION: 100 % | SYSTOLIC BLOOD PRESSURE: 142 MMHG | HEIGHT: 68 IN | HEART RATE: 72 BPM

## 2024-07-16 PROCEDURE — 6360000002 HC RX W HCPCS: Performed by: FAMILY MEDICINE

## 2024-07-16 PROCEDURE — 96372 THER/PROPH/DIAG INJ SC/IM: CPT

## 2024-07-16 PROCEDURE — 2580000003 HC RX 258: Performed by: FAMILY MEDICINE

## 2024-07-16 PROCEDURE — G0378 HOSPITAL OBSERVATION PER HR: HCPCS

## 2024-07-16 PROCEDURE — 6370000000 HC RX 637 (ALT 250 FOR IP): Performed by: FAMILY MEDICINE

## 2024-07-16 RX ADMIN — ASPIRIN 81 MG CHEWABLE TABLET 81 MG: 81 TABLET CHEWABLE at 10:00

## 2024-07-16 RX ADMIN — ENOXAPARIN SODIUM 40 MG: 100 INJECTION SUBCUTANEOUS at 09:59

## 2024-07-16 RX ADMIN — LISINOPRIL 5 MG: 5 TABLET ORAL at 10:04

## 2024-07-16 RX ADMIN — LISINOPRIL 30 MG: 20 TABLET ORAL at 10:00

## 2024-07-16 RX ADMIN — Medication 10 ML: at 10:00

## 2024-07-16 RX ADMIN — HYDROCHLOROTHIAZIDE 12.5 MG: 25 TABLET ORAL at 09:59

## 2024-07-16 ASSESSMENT — PAIN SCALES - GENERAL: PAINLEVEL_OUTOF10: 0

## 2024-07-16 NOTE — DISCHARGE SUMMARY
[facility] Medicine Discharge Summary    Costa Ferrera  :  1945  MRN:  [id number]    Admit date:  2024  Discharge date:  2024    Admitting Physician:  Sammy Gentile MD  Primary Care Physician:  Pham Sotelo, APRN - CNP    Discharge Diagnoses:    Principal Problem:    TIA (transient ischemic attack)  Resolved Problems:    * No resolved hospital problems. *    Chief Complaint   Patient presents with    Dizziness    Extremity Weakness       Condition: improved   Activity: no restrictions   Diet: regular  Disposition: home  Functional Status: ambulatory    Significant Findings:     MRI Brain:  Chronic microvascular disease without acute intracranial abnormality.     Hospital Course:   79-year-old hospitalized for right arm paresthesias concerning for TIA.  Workup returned mostly unremarkable.  He was seen by neurology throughout the hospitalization who also treated him for vertigo.  They will continue aspirin for the possibility of TIA.  He ultimately was discharged home in stable condition on .        Exam on Discharge:   BP (!) 142/72   Pulse 72   Temp 98.2 °F (36.8 °C) (Oral)   Resp 14   Ht 1.727 m (5' 8\")   Wt 57.2 kg (126 lb 1.7 oz)   SpO2 100%   BMI 19.17 kg/m²   General appearance: alert, cooperative and no distress  Mental Status: oriented to person, place and time and normal affect  Lungs: clear to auscultation bilaterally, normal effort  Heart: regular rate and rhythm, no murmur  Abdomen: soft, nontender, nondistended, bowel sounds present, no masses  Extremities: no edema, redness, tenderness in the calves  Skin: no gross lesions, rashes    Discharge Medication List:     Medication List        START taking these medications      meclizine 12.5 MG tablet  Commonly known as: ANTIVERT  Take 1 tablet by mouth in the morning, at noon, and at bedtime for 7 days            CONTINUE taking these medications      Aspirin Low Dose 81 MG EC tablet  Generic

## 2024-07-16 NOTE — CARE COORDINATION
Call to pt second emergency contact Antony, to inquire if able to pick pt up for discharge and drive to pt's pharmacy [facility] on [address], to  medications and then drive home. Antony states he can do that and will be here within a half hour. Updated bedside nurse.

## 2024-07-16 NOTE — CARE COORDINATION
Lsw spoke with [facility] and they explained that the pt does not meet level of care to come in long term care authorization.  Pt will need to DC home with [facility] to follow.       Lsw spoke with pt's POA to explain the above.  She is not happy that the pt will be returning home.  Pt is aware and agreed to follow with his  [employer] to get his assisted living approved.      Message left for pt's friend Jina [phone] to see if she can pick him up to go home.

## 2024-07-17 NOTE — PROGRESS NOTES
Hospitalist Daily Progress Note  Name: Costa Ferrera  Age: 79 y.o.  Gender: male  CodeStatus: Full Code  Allergies: Ciprofloxacin  Sulfa Antibiotics    Chief Complaint:Dizziness and Extremity Weakness      Primary Care Provider: Pham Sotelo APRN - CNP    InpatientTreatment Team: Treatment Team: Attending Provider: Sammy Gentile MD; Consulting Physician: Lance Reinoso MD; Registered Nurse: Deya Aguilar, RN; Registered Nurse: Marlene Ortega RN; Utilization Reviewer: Gema Rees RN    Admission Date: 7/9/2024      Subjective: No chest pain, sob, nausea.  Feels somewhat improved.    Physical Exam  Vitals and nursing note reviewed.   Constitutional:       Appearance: Normal appearance.   Cardiovascular:      Rate and Rhythm: Normal rate and regular rhythm.   Pulmonary:      Effort: Pulmonary effort is normal.      Breath sounds: Normal breath sounds.   Abdominal:      General: Bowel sounds are normal.      Palpations: Abdomen is soft.   Musculoskeletal:         General: Normal range of motion.   Skin:     General: Skin is warm and dry.   Neurological:      Mental Status: He is alert and oriented to person, place, and time. Mental status is at baseline.         Medications:  Reviewed    Infusion Medications:    sodium chloride       Scheduled Medications:    budesonide-formoterol  2 puff Inhalation BID RT    And    tiotropium  2 puff Inhalation Daily RT    hydroCHLOROthiazide  12.5 mg Oral Daily    lisinopril  30 mg Oral Daily    lisinopril  5 mg Oral Daily    pravastatin  20 mg Oral Daily    psyllium husk-aspartame  1 packet Oral Daily    sodium chloride flush  5-40 mL IntraVENous 2 times per day    enoxaparin  40 mg SubCUTAneous Daily    aspirin  81 mg Oral Daily    Or    aspirin  300 mg Rectal Daily     PRN Meds: sodium chloride flush, sodium chloride, ondansetron **OR** ondansetron, polyethylene glycol, labetalol, meclizine    Labs:   Recent Labs     07/09/24  0614 07/10/24  0503   WBC 5.7 5.8 
  Hospitalist Daily Progress Note  Name: Costa Ferrera  Age: 79 y.o.  Gender: male  CodeStatus: Full Code  Allergies: Ciprofloxacin  Sulfa Antibiotics    Chief Complaint:Dizziness and Extremity Weakness      Primary Care Provider: Pham Sotelo APRN - MARICRUZ    InpatientTreatment Team: Treatment Team: Attending Provider: Sammy Gentile MD; Consulting Physician: Lance Reinoso MD; Registered Nurse: Funmilayo Cruz RN; : Danna Flores RN; Registered Nurse: Ora Barnett RN; LPN: Marcia Henderson LPN    Admission Date: 7/9/2024      Subjective: No chest pain, sob, nausea.  Resting in bed.    Physical Exam  Vitals and nursing note reviewed.   Constitutional:       Appearance: Normal appearance.   Cardiovascular:      Rate and Rhythm: Normal rate and regular rhythm.   Pulmonary:      Effort: Pulmonary effort is normal.      Breath sounds: Normal breath sounds.   Abdominal:      General: Bowel sounds are normal.      Palpations: Abdomen is soft.   Musculoskeletal:         General: Normal range of motion.   Skin:     General: Skin is warm and dry.   Neurological:      Mental Status: He is alert and oriented to person, place, and time. Mental status is at baseline.         Medications:  Reviewed    Infusion Medications:    sodium chloride       Scheduled Medications:    budesonide-formoterol  2 puff Inhalation BID RT    And    tiotropium  2 puff Inhalation Daily RT    hydroCHLOROthiazide  12.5 mg Oral Daily    lisinopril  30 mg Oral Daily    lisinopril  5 mg Oral Daily    pravastatin  20 mg Oral Daily    psyllium husk-aspartame  1 packet Oral Daily    sodium chloride flush  5-40 mL IntraVENous 2 times per day    enoxaparin  40 mg SubCUTAneous Daily    aspirin  81 mg Oral Daily    Or    aspirin  300 mg Rectal Daily     PRN Meds: sodium chloride flush, sodium chloride, ondansetron **OR** ondansetron, polyethylene glycol, labetalol, meclizine    Labs:   No results for input(s): \"WBC\", \"HGB\", 
  [facility] Neurology Daily Progress Note  Name: Costa Ferrera  Age: 79 y.o.  Gender: male  CodeStatus: Full Code  Allergies: Ciprofloxacin  Sulfa Antibiotics    Chief Complaint:Dizziness and Extremity Weakness    Primary Care Provider: Pham Sotelo APRN - MARICRUZ  InpatientTreatment Team: Treatment Team: Attending Provider: Sammy Gentile MD; Consulting Physician: Lance Reinoso MD; Registered Nurse: Deya Aguilar RN; Utilization Reviewer: Gema Rees RN  Admission Date: 7/9/2024      HPI   Pt seen and examined for neuro follow up.  Patient is alert and oriented x 3, no acute distress, cooperative.  Vertigo has improved.  Still with some gait ataxia.  Right hand paresthesias resolved.  Denies headache, vision changes.  No dysarthria dysphagia, aphasia.  Mildly hypertensive.  Patient seen and examined vertigo somewhat better improved.  Patient was able to ambulate and no other abnormal features reported.  He has some suggestion of plugging of his right ear    Vitals:    07/11/24 1025   BP: (!) 140/76   Pulse: 64   Resp:    Temp:    SpO2: 100%        Review of Systems   Constitutional:  Negative for appetite change, chills, fatigue and fever.   HENT:  Negative for hearing loss and trouble swallowing.    Eyes:  Negative for visual disturbance.   Respiratory:  Negative for cough, chest tightness, shortness of breath and wheezing.    Cardiovascular:  Negative for chest pain, palpitations and leg swelling.   Gastrointestinal:  Negative for nausea and vomiting.   Genitourinary:  Negative for difficulty urinating.   Musculoskeletal:  Positive for gait problem.   Skin:  Negative for color change and rash.   Neurological:  Positive for dizziness. Negative for tremors, seizures, syncope, facial asymmetry, speech difficulty, weakness, light-headedness, numbness and headaches.   Psychiatric/Behavioral:  Negative for agitation, confusion and hallucinations. The patient is not nervous/anxious.          Physical 
[facility]   Facility/Department: [address] TELE  Speech Language Pathology  Clinical Bedside Swallow Evaluation    NAME:Costa Ferrera  : 1945 (79 y.o.)   [x]   confirmed    MRN: [id number]  ROOM: [place] [address]  ADMISSION DATE: 2024  PATIENT DIAGNOSIS(ES): TIA (transient ischemic attack) [G45.9]  Chief Complaint   Patient presents with    Dizziness    Extremity Weakness     Patient Active Problem List    Diagnosis Date Noted    TIA (transient ischemic attack) 2024    Abnormal computerized axial tomography of chest 2024     Past Medical History:   Diagnosis Date    Cancer (HCC)     Hyperlipidemia     Hypertension     IBS (irritable bowel syndrome)      Past Surgical History:   Procedure Laterality Date    SKIN CANCER EXCISION       Allergies   Allergen Reactions    Ciprofloxacin     Sulfa Antibiotics        DATE ONSET: 2024    Date of Evaluation: 2024   Evaluating Therapist: Ynes Hernandez, SLP    Dysphagia Diagnosis  Dysphagia Diagnosis: Mild oral stage dysphagia  Dysphagia Impression : Pt presents with mild oral dysphagia. Pharygeal phase is suspected to be WFL.  Pt demonstrated decreased mastication of reg solids, likely due to missing bottom dentures, and a piecemeal swallow of soft and reg solids. Pt demonstrated trace lingual residue which was able cleared following a cued reswallow. Pharyngeal phase is suspected to be WFL at this time. Pt demonstrated no overt s/s of aspiration per all consistencies administered. Hyolaryngeal elevation present for all trials via palpation or observation.    Recommended Diet     Diet Solids Recommendation: Regular  Liquid Consistency Recommendation: Thin  Recommended Form of Meds: PO  Compensatory Swallowing Strategies : Alternate solids and liquids;Eat/Feed slowly;Upright as possible for all oral intake;Small bites/sips           Reason for Referral  Costa Ferrrea was referred for a bedside swallow evaluation to assess the 
[facility]   Facility/Department: [address] ORTHO TELE  Speech Language Pathology  Initial Speech/Language/Cognitive Assessment    NAME:Costa Ferrera  : 1945 (79 y.o.)   [x]   confirmed    MRN: [id number]  ROOM: [place] [address]  ADMISSION DATE: 2024  PATIENT DIAGNOSIS(ES): TIA (transient ischemic attack) [G45.9]  Chief Complaint   Patient presents with    Dizziness    Extremity Weakness     Patient Active Problem List    Diagnosis Date Noted    TIA (transient ischemic attack) 2024    Abnormal computerized axial tomography of chest 2024     Past Medical History:   Diagnosis Date    Cancer (HCC)     Hyperlipidemia     Hypertension     IBS (irritable bowel syndrome)      Past Surgical History:   Procedure Laterality Date    SKIN CANCER EXCISION         Date of Onset: 2024    Date of Evaluation: 2024   Evaluating Therapist: Ynes Hernandez, PAULO        Diagnosis: Pt presents with mild to moderate cognitive-linguistic deficits.  Pt demonstrated difficulty following multi-step directions, naming pitcures, and completing convergent/divergent tasks.  Pt demonstrated diffiuclty with reading single words (1x) and may benefit from further testing.    Requires SLP Intervention: Yes     D/C Recommendations: Ongoing speech therapy is recommended during this hospitalization    General  Subjective  Subjective: Pt admitted to ER after becoming dizzy and demonstrating tingling in his right hand.  CXR shows findings to suggest COPD.  CTH was negative.  Behavior/Cognition  Behavior/Cognition: Alert;Cooperative;Pleasant mood   Respiratory Status: Room air  O2 Device: None (Room air)  Previous level of function and limitations:   Social/Functional History  Lives With: Alone  Type of Home: Apartment  Occupation: Retired    Vision and Hearing  Vision  Vision: Impaired  Vision Exceptions: Wears glasses for reading  Hearing  Hearing: Within functional limits     Oral/Motor  Oral Motor   Labial: No 
[facility]   Facility/Department: [address] ORTHO TELE  Speech Language Pathology  Treatment Note      Costa Ferrera  1945  W293/W293-01  [x]   confirmed      Date: 2024    TIA (transient ischemic attack) [G45.9]    Restrictions/Precautions: None, Fall Risk    ADULT DIET; Regular     Respiratory Status:  room air  No active isolations    Subjective:  Alert, Cooperative, Pleasant, and Motivated        Interventions used this date:  Cognitive Skill Development      Objective/Assessment:  Patient progressing towards goals:  Short Term Goals  Time Frame for Short Term Goals: 1 week or LOS until goals are met.  Goal 1: To increase safety awareness and judgment for safe completion of ADLs secondary to patient's cognitive deficits, patient will complete abstract reasoning tasks (i.e. word deduction, convergent and divergent naming, similarities/differences) with 80% accuracy and min cues.  Divergent: 33% acc Ind increasing to 67% acc with min-mod cues   Goal 2: To address patient's cognitive deficits and promote his ability to safely follow directives in a variety of environments, patient will carry out 2-3 directives of increasing complexity in everyday activities with 80% accuracy and min cues.  2 step: 25% acc In increasing to 75% acc with min cues   Goal 3: Pt will complete confrontational naming tasks with 90% accuracy with supervision cues to help the patient express their basic wants and needs.  90% acc I with common objects. Next session target pictures of less common objects to ensure mastery.   Goal 4: Within 1-5 days of implementing the ST POC, pt's functional reading/writing skills will be assessed in relation to executive functioning (e.g. bill paying, reading rx labels, completing personal information), with additional goals added to pt's POC as deemed necesary by treating SLP.  Not addressed     Treatment/Activity Tolerance:  Patient tolerated treatment well    Plan:  Continue per POC    Pain 
[facility]   Facility/Department: [address] ORTHO TELE  Speech Language Pathology  Treatment Note      Costa Ferrera  1945  W293/W293-01  [x]   confirmed      Date: 7/15/2024    TIA (transient ischemic attack) [G45.9]    Restrictions/Precautions: Fall Risk    ADULT DIET; Regular     Respiratory Status:    No active isolations    Subjective:  Alert and Cooperative        Interventions used this date:  Cognitive Skill Development      Objective/Assessment:  Patient progressing towards goals:  Short Term Goals  Time Frame for Short Term Goals: 1 week or LOS until goals are met.  Goal 1: To increase safety awareness and judgment for safe completion of ADLs secondary to patient's cognitive deficits, patient will complete abstract reasoning tasks (i.e. word deduction, convergent and divergent naming, similarities/differences) with 80% accuracy and min cues.  Not addressed this date.  Goal 2: To address patient's cognitive deficits and promote his ability to safely follow directives in a variety of environments, patient will carry out 2-3 directives of increasing complexity in everyday activities with 80% accuracy and min cues.  Not addressed this date.  Goal 3: Pt will complete confrontational naming tasks with 90% accuracy with supervision cues to help the patient express their basic wants and needs.  Pt named 20/20 everyday objects independently with 100% accuracy. Goal met.   Goal 4: Within 1-5 days of implementing the ST POC, pt's functional reading/writing skills will be assessed in relation to executive functioning (e.g. bill paying, reading rx labels, completing personal information), with additional goals added to pt's POC as deemed necesary by treating SLP.  Pt stated that he has a learning/reading disability since he was a kid. Continued to test pt's functional reading/writing skills in relation to bill payment. Pt answered 0/5 questions independently, required max cues to comprehend and identify 
Neurology to sign off.  Call with questions or concerns.  
PT REFUSED HIS SYMBICORT THIS EVENING.SAID IT GAVE HIM AN EAR ACHE. HE WILL TELL THE DRSocrates IN THE MORNING.  
Physical Therapy  Facility/Department: [facility] MED SURG W293/W293-01  Physical Therapy Discharge      NAME: Costa Ferrera    : 1945 (79 y.o.)  MRN: [id number]    Account: [id number]  Gender: male      Patient has been discharged from acute care hospital. DC patient from current PT program.      Electronically signed by Shelby Heck PT on 24 at 4:21 PM EDT    
Physical Therapy Med Surg Daily Treatment Note  Facility/Department: [address] ORTHO TELE  Room: [facility]       NAME: Costa Ferrera  : 1945 (79 y.o.)  MRN: [id number]  CODE STATUS: Full Code    Date of Service: 7/15/2024    Patient Diagnosis(es): TIA (transient ischemic attack) [G45.9]   Chief Complaint   Patient presents with    Dizziness    Extremity Weakness     Patient Active Problem List    Diagnosis Date Noted    TIA (transient ischemic attack) 2024    Abnormal computerized axial tomography of chest 2024        Past Medical History:   Diagnosis Date    Cancer (HCC)     Hyperlipidemia     Hypertension     IBS (irritable bowel syndrome)      Past Surgical History:   Procedure Laterality Date    SKIN CANCER EXCISION         Chart Reviewed: Yes    Restrictions:  Restrictions/Precautions: Fall Risk    SUBJECTIVE:   Subjective: I just got back into bed.    Pain  Pain: Denies pre and post session pain.     OBJECTIVE:   Orientation  Overall Orientation Status: Within Functional Limits    Bed mobility  Rolling to Right: Modified independent  Supine to Sit: Modified independent  Sit to Supine: Modified independent  Bed Mobility Comments: Bed almost flat with use of hand rails; increased time to complete. otherwise steady.    Transfers  Sit to Stand: Modified independent  Stand to Sit: Modified independent  Comment: Use of UE to control descending. vc's for with and without device    Ambulation  Surface: Level tile  Device: Rolling Walker;No Device  Assistance: Supervision  Quality of Gait: NBOS, reciprocal gait pattern, slow eileen, shortened step length.; vc's for step length and pacing  Gait Deviations: Slow Eileen;Decreased step length  Distance: 100' with ' no device  Comments: Pt was equally steady with both trials. Shortened step length and slow eileen.    Stairs/Curb  Stairs?: Yes  Stairs  # Steps : 11  Stairs Height: 6\"  Rails: Right ascending  Assistance: Supervision  Comment: 
Physical Therapy Med Surg Daily Treatment Note  Facility/Department: [address] ORTHO TELE  Room: [facility]/W293-       NAME: Costa Ferrera  : 1945 (79 y.o.)  MRN: [id number]  CODE STATUS: Full Code    Date of Service: 2024    Patient Diagnosis(es): TIA (transient ischemic attack) [G45.9]   Chief Complaint   Patient presents with    Dizziness    Extremity Weakness     Patient Active Problem List    Diagnosis Date Noted    TIA (transient ischemic attack) 2024    Abnormal computerized axial tomography of chest 2024        Past Medical History:   Diagnosis Date    Cancer (HCC)     Hyperlipidemia     Hypertension     IBS (irritable bowel syndrome)      Past Surgical History:   Procedure Laterality Date    SKIN CANCER EXCISION              Restrictions:  Restrictions/Precautions: Fall Risk    SUBJECTIVE:   Subjective: I wish I could poop.  I still can't go.    Pain  Pain: Denies pre and post session pain.    OBJECTIVE:        Bed mobility  Bed Mobility Comments: NT: pt sitting EOB upon arrival and wanting to stay there after treatment done.    Transfers  Sit to Stand: Supervision  Stand to Sit: Supervision  Bed to Chair:  (declined transfer to chair.)    Ambulation  Surface: Level tile  Device: Single point cane  Assistance: Stand by assistance;Supervision  Quality of Gait: Steady gait with 2 point gait but small step length.  Pt able to look side to side or around without LOB.  Gait Deviations: Slow Janine;Decreased step length  Distance: 50' x 4  Comments: No c/o dizziness or lightheadedness with gait.    Stairs/Curb  Stairs?: No (secondary to hoping to discharge to assisted living or SNF.)         Neuromuscular Education  NDT Treatment: Gait   Facilitation techniques: DGI tasks, pertubations applied during gait without LOB.                          ASSESSMENT   Body Structures, Functions, Activity Limitations Requiring Skilled Therapeutic Intervention: Decreased functional mobility ;Decreased ADL 
Physical Therapy Med Surg Daily Treatment Note  Facility/Department: [address] ORTHO TELE  Room: [facility]/W293-       NAME: Costa Ferrera  : 1945 (79 y.o.)  MRN: [id number]  CODE STATUS: Full Code    Date of Service: 2024    Patient Diagnosis(es): TIA (transient ischemic attack) [G45.9]   Chief Complaint   Patient presents with    Dizziness    Extremity Weakness     Patient Active Problem List    Diagnosis Date Noted    TIA (transient ischemic attack) 2024    Abnormal computerized axial tomography of chest 2024        Past Medical History:   Diagnosis Date    Cancer (HCC)     Hyperlipidemia     Hypertension     IBS (irritable bowel syndrome)      Past Surgical History:   Procedure Laterality Date    SKIN CANCER EXCISION         Chart Reviewed: Yes    Restrictions:  Restrictions/Precautions: Fall Risk    SUBJECTIVE:   Subjective: I'm doing  pretty good.    Pain  Pain: Denies pre and post session pain.     OBJECTIVE:   Orientation  Overall Orientation Status: Within Functional Limits    Bed mobility  Rolling to Left: Stand by assistance  Rolling to Right: Stand by assistance  Supine to Sit: Stand by assistance  Sit to Supine: Stand by assistance  Bed Mobility Comments: Bed flat with use of hand rails; increased time and effort to complete; vc's for hand placement and sequencing.    Transfers  Sit to Stand: Supervision  Stand to Sit: Supervision  Comment: vc's for hand placement and technique with WW; good follow through.    Ambulation  Surface: Level tile  Device: Rolling Walker  Assistance: Stand by assistance  Quality of Gait: NBOS, shuffling gait pattern, decreased hip extension; vc's for posture and step length  Gait Deviations: Slow Janine;Decreased step length  Distance: 50'x2  Comments: Pt c/o feeling \"whoozy\" during ambulation. BP taken at end of gait 150/79  HR 65    Stairs/Curb  Stairs?:  (NT d/t safety concerns with \"feeling whoozy.\")                         Activity 
Physical Therapy Med Surg Initial Assessment  Facility/Department: [address] ORTHO TELE  Room: [facility]       NAME: Costa Ferrera  : 1945 (79 y.o.)  MRN: [id number]  CODE STATUS: Full Code    Date of Service: 7/10/2024    Patient Diagnosis(es): TIA (transient ischemic attack) [G45.9]   Chief Complaint   Patient presents with    Dizziness    Extremity Weakness     Patient Active Problem List    Diagnosis Date Noted    TIA (transient ischemic attack) 2024    Abnormal computerized axial tomography of chest 2024        Past Medical History:   Diagnosis Date    Cancer (HCC)     Hyperlipidemia     Hypertension     IBS (irritable bowel syndrome)      Past Surgical History:   Procedure Laterality Date    SKIN CANCER EXCISION         Chart Reviewed: Yes  Family / Caregiver Present: No  Diagnosis: TIA (transient ischemic attack)  General Comment  Comments: Pt up to restroom - agreeable to PT evaluation    Restrictions:  Restrictions/Precautions: None, Fall Risk     SUBJECTIVE:   Subjective: \"I'm doing about the same.\"    Pain  Pain: Denies pre and post session pain.    Prior Level of Function:  Social/Functional History  Lives With: Alone  Type of Home: Apartment  Home Layout: One level (2nd floor)  Home Access: Level entry (12 steps to 2nd floor w/ handrail)  Bathroom Shower/Tub: Tub/Shower unit  Bathroom Toilet: Standard  Bathroom Equipment: None  Bathroom Accessibility: Accessible  Home Equipment: Cane, Walker - Rolling  Has the patient had two or more falls in the past year or any fall with injury in the past year?: No  Receives Help From: Friend(s), Neighbor (assist w/ transportation to  medications)  ADL Assistance: Independent  Homemaking Assistance: Independent  Homemaking Responsibilities: Yes  Ambulation Assistance: Independent  Transfer Assistance: Independent  Active : Yes (friends sometimes help drive in wintertime when roads are bad)  Mode of Transportation: Car  Occupation: 
Physical Therapy Missed Treatment   Facility/Department: [facility] MED SURG W293/W293-01    NAME: Costa Ferrera    : 1945 (79 y.o.)  MRN: [id number]    Account: [id number]  Gender: male    Chart reviewed, attempted PT at 0930. Patient unavailable 2° to:        [x] Pt declined as pt more concerned with needing to have a BM.  \"I haven't had one in two days, and it's bothering me.  I'd rather wait.\"    [x] Nsg notified   [] Other notified          Will attempt PT treatment again at earliest convenience.      Electronically signed by Lisa Yo PTA on 24 at 9:56 AM EDT    
Physical Therapy Missed Treatment   Facility/Department: [facility] MED SURG W293/W293-01    NAME: Costa Ferrera    : 1945 (79 y.o.)  MRN: [id number]    Account: [id number]  Gender: male    Chart reviewed, attempted PT at 9:24. Patient unavailable 2° to:    [] Hold per nsg request    [x] Pt declined d/t being worried and trying to work on things. Encouragement given but still declines.     [] Nsg notified   [] Other notified    [] Pt.. off floor for test/procedure.     [] Pt. Unavailable        Will attempt PT treatment again at earliest convenience.      Electronically signed by Maranda Lopez PTA on 24 at 9:33 AM EDT    
Pt assessment complete.  Pt is alert and oriented.  Pt reports some tingling and numbness to palm of right hand at times.  He states this usually happens after sitting on that hand.  Pt denies pain or nausea.  Call light in reach. Bed alarm on.    
This RN received telephone call from ERUM Bridges. Yuliana states to this RN that she is greatly concerned regarding patient discharging home by himself, even with HHC. SERA Clay as well as Dr Gentile updated of same.     
  Constitutional:       General: He is not in acute distress.     Appearance: He is not diaphoretic.   HENT:      Head: Normocephalic and atraumatic.   Eyes:      Extraocular Movements: Extraocular movements intact.      Pupils: Pupils are equal, round, and reactive to light.   Cardiovascular:      Rate and Rhythm: Normal rate and regular rhythm.   Pulmonary:      Effort: Pulmonary effort is normal. No respiratory distress.      Breath sounds: Normal breath sounds.   Abdominal:      Palpations: Abdomen is soft.   Skin:     General: Skin is warm and dry.   Neurological:      General: No focal deficit present.      Mental Status: He is alert and oriented to person, place, and time.      Cranial Nerves: No cranial nerve deficit.      Sensory: No sensory deficit.      Motor: No weakness.      Coordination: Coordination normal.      Gait: Gait abnormal.               Medications:  Reviewed    Infusion Medications:    sodium chloride       Scheduled Medications:    budesonide-formoterol  2 puff Inhalation BID RT    And    tiotropium  2 puff Inhalation Daily RT    hydroCHLOROthiazide  12.5 mg Oral Daily    lisinopril  30 mg Oral Daily    lisinopril  5 mg Oral Daily    pravastatin  20 mg Oral Daily    psyllium husk-aspartame  1 packet Oral Daily    sodium chloride flush  5-40 mL IntraVENous 2 times per day    enoxaparin  40 mg SubCUTAneous Daily    aspirin  81 mg Oral Daily    Or    aspirin  300 mg Rectal Daily     PRN Meds: sodium chloride flush, sodium chloride, ondansetron **OR** ondansetron, polyethylene glycol, labetalol, meclizine    Labs:   Recent Labs     07/10/24  0503   WBC 5.8   HGB 10.5*   HCT 31.7*          No results for input(s): \"NA\", \"K\", \"CL\", \"CO2\", \"BUN\", \"CREATININE\", \"CALCIUM\", \"PHOS\" in the last 72 hours.    Invalid input(s): \"MAGNES\"    No results for input(s): \"AST\", \"ALT\", \"BILIDIR\", \"BILITOT\", \"ALKPHOS\" in the last 72 hours.    No results for input(s): \"INR\" in the last 72 hours.    No 
ondansetron, polyethylene glycol, labetalol, meclizine    Labs:   No results for input(s): \"WBC\", \"HGB\", \"HCT\", \"PLT\" in the last 72 hours.    No results for input(s): \"NA\", \"K\", \"CL\", \"CO2\", \"BUN\", \"CREATININE\", \"CALCIUM\", \"PHOS\" in the last 72 hours.    Invalid input(s): \"MAGNES\"    No results for input(s): \"AST\", \"ALT\", \"BILIDIR\", \"BILITOT\", \"ALKPHOS\" in the last 72 hours.    No results for input(s): \"INR\" in the last 72 hours.    No results for input(s): \"CKTOTAL\", \"TROPONINI\" in the last 72 hours.    Urinalysis:   Lab Results   Component Value Date/Time    NITRU Negative 01/27/2024 09:30 PM    BLOODU Negative 01/27/2024 09:30 PM    GLUCOSEU Negative 01/27/2024 09:30 PM       Radiology:   Most recent    Chest CT      WITH CONTRAST:No results found for this or any previous visit.       WITHOUT CONTRAST: No results found for this or any previous visit.      CXR      2-view: No results found for this or any previous visit.       Portable: Results for orders placed during the hospital encounter of 07/09/24    XR CHEST PORTABLE    Narrative  EXAMINATION:  ONE XRAY VIEW OF THE CHEST    7/9/2024 6:27 am    COMPARISON:  January 27, 2024    HISTORY:  ORDERING SYSTEM PROVIDED HISTORY: code BAT  TECHNOLOGIST PROVIDED HISTORY:  Reason for exam:->code BAT  What reading provider will be dictating this exam?->CRC    FINDINGS:  Normal cardiomediastinal silhouette. Lungs clear.  There is hyperinflation of  the lungs with flattening of the diaphragms suggesting COPD.    No  pneumothorax or effusion. Body wall soft tissues unremarkable. Osseous thorax  intact.    Impression  1. No acute cardiopulmonary process.  2. Findings suggesting COPD.      Echo No results found for this or any previous visit.            Assessment/Plan:    Active Hospital Problems    Diagnosis Date Noted    TIA (transient ischemic attack) [G45.9] 07/09/2024     TIA, right arm paresthesia  CT head neg, CTA head/neck prelim neg, pt/ot/st, asa, statin, neuro 
  7/10 - await MRI brain per neuro, pt/ot eval  Hx severe vertigo  Longstanding problem, cont antivert  Hx HTN, HLD  DVT proph    Electronically signed by COREY POLLACK MD on 7/10/2024 at 8:46 AM  
[facility], snf denied due to walking 50' x 4, will plan to dc home tomorrow with [facility].  Hx severe vertigo  Longstanding problem, cont antivert  Hx HTN, HLD  DVT proph    Electronically signed by COREY POLLACK MD on 7/13/2024 at 1:53 PM  
meals?: None  AM-[facility] Inpatient Daily Activity Raw Score: 24  AM-[facility] Inpatient ADL T-Scale Score : 57.54  ADL Inpatient CMS 0-100% Score: 0    Therapy key for assistance levels -   Independent/Mod I = Pt. is able to perform task with no assistance but may require a device   Stand by assistance = Pt. does not perform task at an independent level but does not need physical assistance, requires verbal cues  Minimal, Moderate, Maximal Assistance = Pt. requires physical assistance (25%, 50%, 75% assist from helper) for task but is able to actively participate in task   Dependent = Pt. requires total assistance with task and is not able to actively participate with task completion     Plan:  Occupational Therapy Plan  Therapy Duration:  (no acute OT)    Goals:   No OT goals identified.    Patient Goal: Patient goals : \"I just want to figure out what's wrong with me\"     Discussed and agreed upon: Yes Comments:       Therapy Time:   Individual   Time In 1635   Time Out 1649   Minutes 14          Eval: 14 minutes     Electronically signed by:    Anni Mueller OT,   7/9/2024, 5:02 PM

## 2024-07-22 ENCOUNTER — HOSPITAL ENCOUNTER (OUTPATIENT)
Dept: CT IMAGING | Age: 79
Discharge: HOME | End: 2024-07-24
Attending: INTERNAL MEDICINE
Payer: MEDICARE

## 2024-07-22 DIAGNOSIS — J98.2 PNEUMOMEDIASTINUM (HCC): ICD-10-CM

## 2024-07-22 DIAGNOSIS — J43.9 PULMONARY EMPHYSEMA, UNSPECIFIED EMPHYSEMA TYPE (HCC): ICD-10-CM

## 2024-07-22 PROCEDURE — 71250 CT THORAX DX C-: CPT

## 2024-07-30 ENCOUNTER — OFFICE VISIT (OUTPATIENT)
Dept: PULMONOLOGY | Age: 79
End: 2024-07-30
Payer: MEDICARE

## 2024-07-30 VITALS
SYSTOLIC BLOOD PRESSURE: 128 MMHG | HEART RATE: 53 BPM | BODY MASS INDEX: 18.7 KG/M2 | OXYGEN SATURATION: 97 % | DIASTOLIC BLOOD PRESSURE: 66 MMHG | WEIGHT: 123 LBS

## 2024-07-30 DIAGNOSIS — J98.2 PNEUMOMEDIASTINUM (HCC): ICD-10-CM

## 2024-07-30 DIAGNOSIS — R63.4 WEIGHT LOSS, ABNORMAL: ICD-10-CM

## 2024-07-30 DIAGNOSIS — J43.9 PULMONARY EMPHYSEMA, UNSPECIFIED EMPHYSEMA TYPE (HCC): Primary | ICD-10-CM

## 2024-07-30 DIAGNOSIS — R93.89 ABNORMAL CT OF THE CHEST: ICD-10-CM

## 2024-07-30 PROCEDURE — 1036F TOBACCO NON-USER: CPT | Performed by: INTERNAL MEDICINE

## 2024-07-30 PROCEDURE — G8420 CALC BMI NORM PARAMETERS: HCPCS | Performed by: INTERNAL MEDICINE

## 2024-07-30 PROCEDURE — G8427 DOCREV CUR MEDS BY ELIG CLIN: HCPCS | Performed by: INTERNAL MEDICINE

## 2024-07-30 PROCEDURE — 3023F SPIROM DOC REV: CPT | Performed by: INTERNAL MEDICINE

## 2024-07-30 PROCEDURE — 1123F ACP DISCUSS/DSCN MKR DOCD: CPT | Performed by: INTERNAL MEDICINE

## 2024-07-30 PROCEDURE — 99214 OFFICE O/P EST MOD 30 MIN: CPT | Performed by: INTERNAL MEDICINE

## 2024-07-30 NOTE — PROGRESS NOTES
Subjective:             Costa Ferrera is a 79 y.o. male who complains today of:     Chief Complaint   Patient presents with    Follow-up     4wk f/u for CT results       HPI  He had CT chest  and came for f/u   IMPRESSION:  Severe emphysematous disease is again seen.  Pneumomediastinum posterior to  the mainstem bronchi is again visualized.  The exam is unchanged from prior  study.  Patient was seen before by dr. Garcia .  C/o shortness of breath  with exertion, walking long distance and climbing stairs. .   No Wheezing. C/o Cough with white  Sputum.No Hemoptysis.  No Chest tightness. No Chest pain with radiation  or pleuritic pain.No  leg edema. No orthopnea.No Fever or chills. No Rhinorrhea and postnasal drip.  C/o sneezing      He said he was 89  about 6 month ago , he has good appetite but losing weight.      He is not using any bronchodilator .   He has h/o  smoking  1ppd for 40 yrs , quit 25 yrs ago      6/18/24  IMPRESSION:  No large filling defects seen to suggest pulmonary emboli.  There are severe  emphysematous changes seen predominantly in the right upper lung fields.  Also there are areas of pneumo mediastinum seen about the upper lobe main  bronchi.  Recommend follow-up of upper lobe opacities which may reflect  pleural thickening.  Findings discussed with  on today's date at 1:20  p.m. indeterminate hypodensity is seen in the left kidney, consider  ultrasound.       Allergies:  Ciprofloxacin and Sulfa antibiotics  Past Medical History:   Diagnosis Date    Cancer (HCC)     Hyperlipidemia     Hypertension     IBS (irritable bowel syndrome)      Past Surgical History:   Procedure Laterality Date    SKIN CANCER EXCISION       No family history on file.  Social History     Socioeconomic History    Marital status: Single     Spouse name: Not on file    Number of children: Not on file    Years of education: Not on file    Highest education level: Not on file   Occupational History    Not on file

## 2024-09-27 ENCOUNTER — OFFICE VISIT (OUTPATIENT)
Dept: GERIATRIC MEDICINE | Age: 79
End: 2024-09-27
Payer: MEDICARE

## 2024-09-27 DIAGNOSIS — E78.5 HYPERLIPIDEMIA, UNSPECIFIED HYPERLIPIDEMIA TYPE: ICD-10-CM

## 2024-09-27 DIAGNOSIS — R52 PAIN: ICD-10-CM

## 2024-09-27 DIAGNOSIS — U07.1 COVID-19: Primary | ICD-10-CM

## 2024-09-27 DIAGNOSIS — I10 PRIMARY HYPERTENSION: ICD-10-CM

## 2024-09-27 PROCEDURE — 1123F ACP DISCUSS/DSCN MKR DOCD: CPT | Performed by: NURSE PRACTITIONER

## 2024-09-27 PROCEDURE — G8420 CALC BMI NORM PARAMETERS: HCPCS | Performed by: NURSE PRACTITIONER

## 2024-09-27 PROCEDURE — 1036F TOBACCO NON-USER: CPT | Performed by: NURSE PRACTITIONER

## 2024-09-27 PROCEDURE — 99349 HOME/RES VST EST MOD MDM 40: CPT | Performed by: NURSE PRACTITIONER

## 2024-10-01 ENCOUNTER — APPOINTMENT (OUTPATIENT)
Dept: CT IMAGING | Age: 79
End: 2024-10-01
Payer: MEDICARE

## 2024-10-01 ENCOUNTER — HOSPITAL ENCOUNTER (EMERGENCY)
Age: 79
Discharge: NURSING, REHAB OR LONG-TERM CARE | End: 2024-10-01
Attending: EMERGENCY MEDICINE
Payer: MEDICARE

## 2024-10-01 VITALS
WEIGHT: 130 LBS | OXYGEN SATURATION: 98 % | RESPIRATION RATE: 18 BRPM | TEMPERATURE: 98.7 F | HEART RATE: 63 BPM | SYSTOLIC BLOOD PRESSURE: 157 MMHG | BODY MASS INDEX: 19.7 KG/M2 | DIASTOLIC BLOOD PRESSURE: 70 MMHG | HEIGHT: 68 IN

## 2024-10-01 DIAGNOSIS — U07.1 COVID-19: Primary | ICD-10-CM

## 2024-10-01 DIAGNOSIS — R53.83 FATIGUE, UNSPECIFIED TYPE: ICD-10-CM

## 2024-10-01 LAB
ALBUMIN SERPL-MCNC: 3.8 G/DL (ref 3.5–4.6)
ALP SERPL-CCNC: 213 U/L (ref 35–104)
ALT SERPL-CCNC: 53 U/L (ref 0–41)
ANION GAP SERPL CALCULATED.3IONS-SCNC: 8 MEQ/L (ref 9–15)
AST SERPL-CCNC: 37 U/L (ref 0–40)
BASOPHILS # BLD: 0 K/UL (ref 0–0.2)
BASOPHILS NFR BLD: 0.5 %
BILIRUB SERPL-MCNC: 0.5 MG/DL (ref 0.2–0.7)
BILIRUB UR QL STRIP: NEGATIVE
BUN SERPL-MCNC: 16 MG/DL (ref 8–23)
CALCIUM SERPL-MCNC: 8.8 MG/DL (ref 8.5–9.9)
CHLORIDE SERPL-SCNC: 108 MEQ/L (ref 95–107)
CLARITY UR: CLEAR
CO2 SERPL-SCNC: 28 MEQ/L (ref 20–31)
COLOR UR: YELLOW
CREAT SERPL-MCNC: 0.82 MG/DL (ref 0.7–1.2)
EOSINOPHIL # BLD: 0.2 K/UL (ref 0–0.7)
EOSINOPHIL NFR BLD: 3.9 %
ERYTHROCYTE [DISTWIDTH] IN BLOOD BY AUTOMATED COUNT: 14.2 % (ref 11.5–14.5)
GLOBULIN SER CALC-MCNC: 2.7 G/DL (ref 2.3–3.5)
GLUCOSE SERPL-MCNC: 106 MG/DL (ref 70–99)
GLUCOSE UR STRIP-MCNC: NEGATIVE MG/DL
HCT VFR BLD AUTO: 34.8 % (ref 42–52)
HGB BLD-MCNC: 11.9 G/DL (ref 14–18)
HGB UR QL STRIP: NEGATIVE
INFLUENZA A BY PCR: NEGATIVE
INFLUENZA B BY PCR: NEGATIVE
KETONES UR STRIP-MCNC: NEGATIVE MG/DL
LACTATE BLDV-SCNC: 0.8 MMOL/L (ref 0.5–2.2)
LEUKOCYTE ESTERASE UR QL STRIP: NEGATIVE
LYMPHOCYTES # BLD: 1.6 K/UL (ref 1–4.8)
LYMPHOCYTES NFR BLD: 37.4 %
MAGNESIUM SERPL-MCNC: 1.9 MG/DL (ref 1.7–2.4)
MCH RBC QN AUTO: 32.5 PG (ref 27–31.3)
MCHC RBC AUTO-ENTMCNC: 34.2 % (ref 33–37)
MCV RBC AUTO: 95.1 FL (ref 79–92.2)
MONOCYTES # BLD: 0.5 K/UL (ref 0.2–0.8)
MONOCYTES NFR BLD: 12.6 %
NEUTROPHILS # BLD: 1.9 K/UL (ref 1.4–6.5)
NEUTS SEG NFR BLD: 45.4 %
NITRITE UR QL STRIP: NEGATIVE
PH UR STRIP: 7 [PH] (ref 5–9)
PLATELET # BLD AUTO: 136 K/UL (ref 130–400)
POTASSIUM SERPL-SCNC: 4 MEQ/L (ref 3.4–4.9)
PROCALCITONIN SERPL IA-MCNC: 0.06 NG/ML (ref 0–0.15)
PROT SERPL-MCNC: 6.5 G/DL (ref 6.3–8)
PROT UR STRIP-MCNC: NEGATIVE MG/DL
RBC # BLD AUTO: 3.66 M/UL (ref 4.7–6.1)
SARS-COV-2 RDRP RESP QL NAA+PROBE: DETECTED
SODIUM SERPL-SCNC: 144 MEQ/L (ref 135–144)
SP GR UR STRIP: 1.01 (ref 1–1.03)
TROPONIN, HIGH SENSITIVITY: 14 NG/L (ref 0–19)
TROPONIN, HIGH SENSITIVITY: 14 NG/L (ref 0–19)
URINE REFLEX TO CULTURE: NORMAL
UROBILINOGEN UR STRIP-ACNC: 0.2 E.U./DL
WBC # BLD AUTO: 4.1 K/UL (ref 4.8–10.8)

## 2024-10-01 PROCEDURE — 81003 URINALYSIS AUTO W/O SCOPE: CPT

## 2024-10-01 PROCEDURE — 71250 CT THORAX DX C-: CPT

## 2024-10-01 PROCEDURE — 87502 INFLUENZA DNA AMP PROBE: CPT

## 2024-10-01 PROCEDURE — 80053 COMPREHEN METABOLIC PANEL: CPT

## 2024-10-01 PROCEDURE — 84145 PROCALCITONIN (PCT): CPT

## 2024-10-01 PROCEDURE — 87635 SARS-COV-2 COVID-19 AMP PRB: CPT

## 2024-10-01 PROCEDURE — 99284 EMERGENCY DEPT VISIT MOD MDM: CPT

## 2024-10-01 PROCEDURE — 85025 COMPLETE CBC W/AUTO DIFF WBC: CPT

## 2024-10-01 PROCEDURE — 83605 ASSAY OF LACTIC ACID: CPT

## 2024-10-01 PROCEDURE — 93005 ELECTROCARDIOGRAM TRACING: CPT

## 2024-10-01 PROCEDURE — 83735 ASSAY OF MAGNESIUM: CPT

## 2024-10-01 PROCEDURE — 2580000003 HC RX 258

## 2024-10-01 PROCEDURE — 84484 ASSAY OF TROPONIN QUANT: CPT

## 2024-10-01 RX ORDER — 0.9 % SODIUM CHLORIDE 0.9 %
1000 INTRAVENOUS SOLUTION INTRAVENOUS ONCE
Status: COMPLETED | OUTPATIENT
Start: 2024-10-01 | End: 2024-10-01

## 2024-10-01 RX ADMIN — SODIUM CHLORIDE 1000 ML: 9 INJECTION, SOLUTION INTRAVENOUS at 09:49

## 2024-10-01 ASSESSMENT — ENCOUNTER SYMPTOMS: COUGH: 1

## 2024-10-01 ASSESSMENT — PAIN - FUNCTIONAL ASSESSMENT: PAIN_FUNCTIONAL_ASSESSMENT: NONE - DENIES PAIN

## 2024-10-01 NOTE — ED NOTES
Swabs collected and sent at this time  Fluids running, radiology testing completed. Patient sitting up in the bed, lights on, call light with in reach, respirations even and unlabored. Bed alarm on

## 2024-10-01 NOTE — ED PROVIDER NOTES
[facility] ED  EMERGENCY DEPARTMENT ENCOUNTER      Pt Name: Costa Ferrera  MRN: [id number]  Birthdate 1945  Date of evaluation: 10/1/2024  Provider: CELIA Thornton  8:52 AM EDT    CHIEF COMPLAINT       Chief Complaint   Patient presents with    Fatigue     Weakness COVID + 09/27/2024         HISTORY OF PRESENT ILLNESS   (Location/Symptom, Timing/Onset, Context/Setting, Quality, Duration, Modifying Factors, Severity)  Note limiting factors.   Costa Ferrera is a 79 y.o. male whom per chart review has a PMHx of hypertension, hyperlipidemia, IBS, TIA presents to ED via EMS from [facility] for evaluation of fatigue.  Patient reports that he tested positive for COVID-19 on 09/27/2024.  Patient states that since testing positive he has had increasing weakness, fatigue, nonproductive cough, R sided rib pain, chills, fever.  Patient states that symptoms have been present for at least 5 days.  Patient reports that he is still tolerating intake.  Reports that the NH staff has been administering Tylenol and patient does report moderate improvement in symptoms following Tylenol administration.  Patient verbalizes no additional complaints or patient denies chest pain, shortness of breath, N/V/D.    HPI    Nursing Notes were reviewed.    REVIEW OF SYSTEMS    (2-9 systems for level 4, 10 or more for level 5)     Review of Systems   Constitutional:  Positive for chills, fatigue and fever.   Respiratory:  Positive for cough.    Neurological:  Positive for weakness.   All other systems reviewed and are negative.      Except as noted above the remainder of the review of systems was reviewed and negative.       PAST MEDICAL HISTORY     Past Medical History:   Diagnosis Date    Cancer (HCC)     Hyperlipidemia     Hypertension     IBS (irritable bowel syndrome)          SURGICAL HISTORY       Past Surgical History:   Procedure Laterality Date    SKIN CANCER EXCISION           CURRENT

## 2024-10-01 NOTE — ED NOTES
Patient sitting up in the bed at this time, lights on, fluids running, call light with in reach, respirations even and un labored.

## 2024-10-01 NOTE — ED NOTES
Patient sitting up in the bed with lights on, call light with in reach, respirations even and unlabored

## 2024-10-01 NOTE — ED TRIAGE NOTES
Patient arrived via EMS due to having COVID dx on 09/27/2024, patient states he has not been feeling well for 5 days at least. He is feeling slightly weak and has a cough, but no other complaints. He has not taken any of his morning medications.

## 2024-10-01 NOTE — DISCHARGE INSTRUCTIONS
Take Tylenol as needed for pain, discomfort, fever.     Follow-up with PCP.     Return to ED if any new, or worsening symptoms.

## 2024-10-02 LAB
EKG ATRIAL RATE: 52 BPM
EKG P AXIS: 69 DEGREES
EKG P-R INTERVAL: 158 MS
EKG Q-T INTERVAL: 438 MS
EKG QRS DURATION: 80 MS
EKG QTC CALCULATION (BAZETT): 407 MS
EKG R AXIS: 11 DEGREES
EKG T AXIS: 43 DEGREES
EKG VENTRICULAR RATE: 52 BPM

## 2024-10-21 NOTE — PROGRESS NOTES
Name: Costa Ferrera   Facility: [address]  [address]    Date: 9/27/2024     Subjective:     Chief Complaint   Patient presents with    New Patient     COVID-19, hypertension, hyperlipidemia, pain, constipation       HPI  Costa Ferrera is a 79 y.o. male being seen today for evaluation of COVID-19, hypertension, hyperlipidemia, chronic pain.  Resident was admitted to [facility] 9/13/2024, unable to care for himself independently at home.  He is alert and oriented x 2-3, elderly, frail, no acute distress.  Resident tested positive for COVID-19 today, was complaining of chest congestion, low-grade fever, no cough.  Oxygen saturation 97%, BP within acceptable range.  He received Tylenol for low-grade fever, fever resolved.  Lung sounds are clear and diminished in lower lobes, heart rate regular, no edema.  He does use a walker and a cane for mobility, has had the COVID-vaccine.  Denies complaint of sore throat loss of taste loss of sense of smell nausea vomiting fever chills.  Continue isolation and current plan of care.    Past Medical History:   Diagnosis Date    Cancer (HCC)     Hyperlipidemia     Hypertension     IBS (irritable bowel syndrome)          Allergies:  Reviewed in nursing facility records  Medications:  Reviewed and reconciled in nursing facility records    Review of Systems  A 10 Point review of systems was performed and is negative unless previously stated      Objective:   See nursing facility record for vital signs.      Physical Exam  Constitutional: Up with walker and cane, elderly, frail, in no acute distess  Pulmonary/Chest:  breathing unlabored, no use of accessory muscles, lung sounds clear and diminished in lower lobes, no shortness of breath at rest, dyspnea with activity, dry cough  Cardiovascular:  S1-S2 regular, 2+ DP x 2, no edema  Abd/GI:  abdomen soft, flat, non-distended, non-tender, active bowel sounds x 4 quadrants  : no SP

## 2024-12-11 ENCOUNTER — HOSPITAL ENCOUNTER (EMERGENCY)
Age: 79
Discharge: NURSING, REHAB OR LONG-TERM CARE | End: 2024-12-12
Payer: MEDICARE

## 2024-12-11 ENCOUNTER — APPOINTMENT (OUTPATIENT)
Dept: CT IMAGING | Age: 79
End: 2024-12-11
Payer: MEDICARE

## 2024-12-11 ENCOUNTER — APPOINTMENT (OUTPATIENT)
Dept: GENERAL RADIOLOGY | Age: 79
End: 2024-12-11
Payer: MEDICARE

## 2024-12-11 DIAGNOSIS — I10 PRIMARY HYPERTENSION: Primary | ICD-10-CM

## 2024-12-11 LAB
ALBUMIN SERPL-MCNC: 4.1 G/DL (ref 3.5–4.6)
ALP SERPL-CCNC: 137 U/L (ref 35–104)
ALT SERPL-CCNC: 29 U/L (ref 0–41)
ANION GAP SERPL CALCULATED.3IONS-SCNC: 10 MEQ/L (ref 9–15)
AST SERPL-CCNC: 32 U/L (ref 0–40)
BASOPHILS # BLD: 0 K/UL (ref 0–0.2)
BASOPHILS NFR BLD: 0.5 %
BILIRUB SERPL-MCNC: 0.4 MG/DL (ref 0.2–0.7)
BNP BLD-MCNC: 696 PG/ML
BUN SERPL-MCNC: 21 MG/DL (ref 8–23)
CALCIUM SERPL-MCNC: 9 MG/DL (ref 8.5–9.9)
CHLORIDE SERPL-SCNC: 104 MEQ/L (ref 95–107)
CO2 SERPL-SCNC: 28 MEQ/L (ref 20–31)
CREAT SERPL-MCNC: 0.79 MG/DL (ref 0.7–1.2)
EOSINOPHIL # BLD: 0.4 K/UL (ref 0–0.7)
EOSINOPHIL NFR BLD: 4.7 %
ERYTHROCYTE [DISTWIDTH] IN BLOOD BY AUTOMATED COUNT: 14.6 % (ref 11.5–14.5)
GLOBULIN SER CALC-MCNC: 2.6 G/DL (ref 2.3–3.5)
GLUCOSE SERPL-MCNC: 93 MG/DL (ref 70–99)
HCT VFR BLD AUTO: 34.8 % (ref 42–52)
HGB BLD-MCNC: 11.3 G/DL (ref 14–18)
LYMPHOCYTES # BLD: 2.1 K/UL (ref 1–4.8)
LYMPHOCYTES NFR BLD: 25.6 %
MCH RBC QN AUTO: 31 PG (ref 27–31.3)
MCHC RBC AUTO-ENTMCNC: 32.5 % (ref 33–37)
MCV RBC AUTO: 95.6 FL (ref 79–92.2)
MONOCYTES # BLD: 0.7 K/UL (ref 0.2–0.8)
MONOCYTES NFR BLD: 8.3 %
NEUTROPHILS # BLD: 5 K/UL (ref 1.4–6.5)
NEUTS SEG NFR BLD: 60.7 %
PLATELET # BLD AUTO: 174 K/UL (ref 130–400)
POTASSIUM SERPL-SCNC: 3.9 MEQ/L (ref 3.4–4.9)
PROT SERPL-MCNC: 6.7 G/DL (ref 6.3–8)
RBC # BLD AUTO: 3.64 M/UL (ref 4.7–6.1)
SODIUM SERPL-SCNC: 142 MEQ/L (ref 135–144)
TROPONIN, HIGH SENSITIVITY: 11 NG/L (ref 0–19)
WBC # BLD AUTO: 8.3 K/UL (ref 4.8–10.8)

## 2024-12-11 PROCEDURE — 71045 X-RAY EXAM CHEST 1 VIEW: CPT

## 2024-12-11 PROCEDURE — 84484 ASSAY OF TROPONIN QUANT: CPT

## 2024-12-11 PROCEDURE — 80053 COMPREHEN METABOLIC PANEL: CPT

## 2024-12-11 PROCEDURE — 93005 ELECTROCARDIOGRAM TRACING: CPT | Performed by: PHYSICIAN ASSISTANT

## 2024-12-11 PROCEDURE — 83880 ASSAY OF NATRIURETIC PEPTIDE: CPT

## 2024-12-11 PROCEDURE — 85025 COMPLETE CBC W/AUTO DIFF WBC: CPT

## 2024-12-11 PROCEDURE — 99285 EMERGENCY DEPT VISIT HI MDM: CPT

## 2024-12-11 PROCEDURE — 70450 CT HEAD/BRAIN W/O DYE: CPT

## 2024-12-11 ASSESSMENT — PAIN - FUNCTIONAL ASSESSMENT: PAIN_FUNCTIONAL_ASSESSMENT: NONE - DENIES PAIN

## 2024-12-12 VITALS
TEMPERATURE: 98.2 F | RESPIRATION RATE: 11 BRPM | OXYGEN SATURATION: 98 % | DIASTOLIC BLOOD PRESSURE: 56 MMHG | HEART RATE: 58 BPM | BODY MASS INDEX: 20.07 KG/M2 | SYSTOLIC BLOOD PRESSURE: 147 MMHG | WEIGHT: 132 LBS

## 2024-12-12 LAB
EKG ATRIAL RATE: 58 BPM
EKG P AXIS: 72 DEGREES
EKG P-R INTERVAL: 150 MS
EKG Q-T INTERVAL: 422 MS
EKG QRS DURATION: 82 MS
EKG QTC CALCULATION (BAZETT): 414 MS
EKG R AXIS: -4 DEGREES
EKG T AXIS: 29 DEGREES
EKG VENTRICULAR RATE: 58 BPM

## 2024-12-12 PROCEDURE — 93010 ELECTROCARDIOGRAM REPORT: CPT | Performed by: INTERNAL MEDICINE

## 2024-12-12 NOTE — ED PROVIDER NOTES
are clear bilaterally.  No slurred speech.   Psychiatric:         Mood and Affect: Mood normal.         Behavior: Behavior normal.         Thought Content: Thought content normal.         Judgment: Judgment normal.           DIAGNOSTIC RESULTS     RADIOLOGY:   Non-plain film images such as CT, Ultrasound and MRI are read by the radiologist. Plain radiographic images are visualized and preliminarilyinterpreted by Tim Sanchez PA-C with the below findings:  Read By Myself:        Interpretation per the Radiologist below, if available at the time of this note:    CT HEAD WO CONTRAST   Final Result   No acute intracranial abnormality.         XR CHEST PORTABLE   Final Result   No radiographic evidence of acute cardiopulmonary abnormality.             LABS:  Labs Reviewed   CBC WITH AUTO DIFFERENTIAL - Abnormal; Notable for the following components:       Result Value    RBC 3.64 (*)     Hemoglobin 11.3 (*)     Hematocrit 34.8 (*)     MCV 95.6 (*)     MCHC 32.5 (*)     RDW 14.6 (*)     All other components within normal limits   COMPREHENSIVE METABOLIC PANEL - Abnormal; Notable for the following components:    Alkaline Phosphatase 137 (*)     All other components within normal limits   TROPONIN   BRAIN NATRIURETIC PEPTIDE       All other labs were within normal range or not returnedas of this dictation.    EMERGENCYDEPARTMENT COURSE and DIFFERENTIAL DIAGNOSIS/MDM:   Vitals:    Vitals:    12/11/24 2100 12/11/24 2130 12/11/24 2200 12/11/24 2315   BP: (!) 169/67 (!) 160/56 (!) 145/130 (!) 172/66   Pulse: 61 64 63 76   Resp: 16 18 19 20   Temp:       TempSrc:       SpO2: 99% 96% 98% 98%   Weight:             Medical Decision Making  Presentation consistent with chronic hypertension per chart review.  Reported dizziness is chronic for him and he has been seen for this in the past.  He has no gross focal deficits.  Vertigo does appear to be more positional in nature likely peripheral.  Lab work today without emergent

## 2024-12-12 NOTE — ED NOTES
Pt came via Ems from [facility] for hypertension and dizziness.  Upon assessment pt has no complaints and denies dizziness.   VSS  Afebrile  Skin is Wnl

## 2024-12-12 NOTE — DISCHARGE INSTRUCTIONS
Please follow very closely with your primary physician for reevaluation.  Your blood pressure did trend downward to 140s over 60s without any intervention in department.  It may take several days for you to reach a steady state on the new dose of lisinopril.  Return for chest pain shortness of breath severe headache lower extremity swelling or any other new or worsening symptoms.  You may benefit from referral to neurology given your reported dizziness for several months.

## 2025-01-24 ENCOUNTER — OFFICE VISIT (OUTPATIENT)
Dept: GERIATRIC MEDICINE | Age: 80
End: 2025-01-24
Payer: MEDICARE

## 2025-01-24 DIAGNOSIS — I10 PRIMARY HYPERTENSION: Primary | ICD-10-CM

## 2025-01-24 DIAGNOSIS — H91.93 BILATERAL HEARING LOSS, UNSPECIFIED HEARING LOSS TYPE: ICD-10-CM

## 2025-01-24 DIAGNOSIS — E78.5 HYPERLIPIDEMIA, UNSPECIFIED HYPERLIPIDEMIA TYPE: ICD-10-CM

## 2025-01-24 DIAGNOSIS — E55.9 VITAMIN D DEFICIENCY: ICD-10-CM

## 2025-01-24 PROCEDURE — 1036F TOBACCO NON-USER: CPT | Performed by: NURSE PRACTITIONER

## 2025-01-24 PROCEDURE — 3078F DIAST BP <80 MM HG: CPT | Performed by: NURSE PRACTITIONER

## 2025-01-24 PROCEDURE — 99348 HOME/RES VST EST LOW MDM 30: CPT | Performed by: NURSE PRACTITIONER

## 2025-01-24 PROCEDURE — 1123F ACP DISCUSS/DSCN MKR DOCD: CPT | Performed by: NURSE PRACTITIONER

## 2025-01-24 PROCEDURE — G8420 CALC BMI NORM PARAMETERS: HCPCS | Performed by: NURSE PRACTITIONER

## 2025-01-24 PROCEDURE — 3077F SYST BP >= 140 MM HG: CPT | Performed by: NURSE PRACTITIONER

## 2025-02-21 ENCOUNTER — OFFICE VISIT (OUTPATIENT)
Dept: GERIATRIC MEDICINE | Age: 80
End: 2025-02-21

## 2025-02-21 DIAGNOSIS — H61.23 BILATERAL IMPACTED CERUMEN: Primary | ICD-10-CM

## 2025-02-21 DIAGNOSIS — H66.90 ACUTE OTITIS MEDIA, UNSPECIFIED OTITIS MEDIA TYPE: ICD-10-CM

## 2025-02-21 DIAGNOSIS — H91.93 BILATERAL HEARING LOSS, UNSPECIFIED HEARING LOSS TYPE: ICD-10-CM

## 2025-02-21 DIAGNOSIS — H92.03 OTALGIA OF BOTH EARS: ICD-10-CM

## 2025-02-22 NOTE — PROGRESS NOTES
Name: Costa Ferrera   Facility: [facility] [address]  [address]    Date: 2/21/2025     Subjective:     Chief Complaint   Patient presents with    Follow-up       HPI  Costa Ferrera is a 80 y.o. male being seen today for evaluation of bilateral ear pain, impacted cerumen, suspect otitis media.  Resident is up in his room, with rollator, no acute distress.  He is complaining of ear pain in both ears especially the right ear, onset 2 to 3 weeks.  He had Debrox drops which helped some but were ineffective in resolving his pain and dizziness.  Resident has history of vertigo.  Upon evaluation both ears have cerumen, can mildly see tympanic membrane but mostly cerumen, mild bulge and fluid in tympanic membrane.  Vital signs stable no fever.  Resident needs to go out to ENT for evaluation and removal of cerumen.    Past Medical History:   Diagnosis Date    Cancer (HCC)     Hyperlipidemia     Hypertension     IBS (irritable bowel syndrome)          Allergies:  Reviewed in nursing facility records  Medications:  Reviewed and reconciled in nursing facility records    Review of Systems  A 10 Point review of systems was performed and is negative unless previously stated      Objective:   See nursing facility record for vital signs.      Physical Exam  Constitutional:  up with rollator, thin elderly, in no acute distress  ENT: Cerumen in both ears, tympanic membrane with fluid, hearing loss  Pulmonary/Chest:  lung sounds clear and diminished in lower lobes, no shortness of breath  Cardiovascular:  S1-S2 regular, no edema  Abd/GI:  abdomen soft, round, active bowel sounds x 4 quadrants  MS/Extremities:  OLIVARES, ROM limited, abnormal gait  Psych:  A/O x 3, cooperative with care      Diagnosis Orders   1. Bilateral impacted cerumen        2. Bilateral hearing loss, unspecified hearing loss type        3. Acute otitis media, unspecified otitis media type        4. Otalgia of both ears

## 2025-02-24 VITALS
SYSTOLIC BLOOD PRESSURE: 140 MMHG | DIASTOLIC BLOOD PRESSURE: 78 MMHG | OXYGEN SATURATION: 98 % | RESPIRATION RATE: 18 BRPM | HEART RATE: 68 BPM | TEMPERATURE: 97.8 F

## 2025-02-24 PROBLEM — E78.5 HYPERLIPIDEMIA: Status: ACTIVE | Noted: 2025-02-24

## 2025-02-24 PROBLEM — E55.9 VITAMIN D DEFICIENCY: Status: ACTIVE | Noted: 2025-02-24

## 2025-02-24 PROBLEM — I10 PRIMARY HYPERTENSION: Status: ACTIVE | Noted: 2025-02-24

## 2025-02-24 PROBLEM — H91.93 BILATERAL HEARING LOSS: Status: ACTIVE | Noted: 2025-02-24

## 2025-02-25 NOTE — PROGRESS NOTES
Name: Costa Ferrera   Facility: [facility] [address]  [address]    Date: 1/24/2025     Subjective:     Chief Complaint   Patient presents with    Follow-up       HPI  Costa Ferrera is a 80 y.o. male being seen today for evaluation of hypertension hyperlipidemia and bilateral hearing loss and vertigo.  Nursing staff report elevation blood pressures over the past month, changes made to BP meds.  Blood pressure 140/78 today, heart rate 68.  Resident states \"my blood pressure seems to be getting a little better, denies headache lightheadedness or dizziness today.  His biggest complaint is hearing loss, has impacted cerumen.  He is up with his a rollator, gait fairly steady.  Needs annual labs.    Past Medical History:   Diagnosis Date    Cancer (HCC)     Hyperlipidemia     Hypertension     IBS (irritable bowel syndrome)          Allergies:  Reviewed in nursing facility records  Medications:  Reviewed and reconciled in nursing facility records    Review of Systems  A 10 Point review of systems was performed and is negative unless previously stated      Objective:   See nursing facility record for vital signs.  BP (!) 140/78   Pulse 68   Temp 97.8 °F (36.6 °C)   Resp 18   SpO2 98%     Physical Exam  Constitutional:  up with rollator, elderly, thin, in no acute distess  Pulmonary/Chest:  lung sounds clear and diminished in lower lobes, no shortness of breath at rest, mild dyspnea with exertion  Cardiovascular:  S1-S2 regular, no edema  Abd/GI:  abdomen soft, round, active bowel sounds x 4 quadrants  MS/Extremities:  OLIVARES, ROM limited, abnormal gait  Psych:  A/O x 3, cooperative with care      Diagnosis Orders   1. Primary hypertension        2. Hyperlipidemia, unspecified hyperlipidemia type        3. Bilateral hearing loss, unspecified hearing loss type        4. Vitamin D deficiency              Assessment and Plan:      1. Primary hypertension  Blood pressure

## 2025-03-21 ENCOUNTER — OFFICE VISIT (OUTPATIENT)
Dept: GERIATRIC MEDICINE | Age: 80
End: 2025-03-21
Payer: MEDICARE

## 2025-03-21 DIAGNOSIS — R19.09 MASS OF LEFT INGUINAL REGION: ICD-10-CM

## 2025-03-21 DIAGNOSIS — R10.32 LEFT GROIN PAIN: Primary | ICD-10-CM

## 2025-03-21 PROCEDURE — 1123F ACP DISCUSS/DSCN MKR DOCD: CPT | Performed by: NURSE PRACTITIONER

## 2025-03-21 PROCEDURE — 99348 HOME/RES VST EST LOW MDM 30: CPT | Performed by: NURSE PRACTITIONER

## 2025-03-21 PROCEDURE — 1036F TOBACCO NON-USER: CPT | Performed by: NURSE PRACTITIONER

## 2025-03-21 PROCEDURE — G8420 CALC BMI NORM PARAMETERS: HCPCS | Performed by: NURSE PRACTITIONER

## 2025-03-31 PROBLEM — R10.32 LEFT GROIN PAIN: Status: ACTIVE | Noted: 2025-03-31

## 2025-03-31 PROBLEM — R19.09 MASS OF LEFT INGUINAL REGION: Status: ACTIVE | Noted: 2025-03-31

## 2025-03-31 NOTE — PROGRESS NOTES
Name: Costa Ferrera   Facility: [facility] at [address]  [address]    Date: 3/21/2025     Subjective:     Chief Complaint   Patient presents with    Follow-up       HPI  Cotsa Ferrera is a 80 y.o. male being seen today for evaluation of left groin pain and possible mass.  Resident reported to staff that he \"has a lump on the left side of his groin, painful to touch at times, onset about 2 weeks ago.\"  Upon examination, resident points to the area where he initially felt the lump, nothing visualized or felt during the examination or with coughing.  Resident denies h/o hernia.  Pain 0/10 today. No redness, swelling. He denies heavy lifting. Discussed the possibilities such as hernia, lipoma, enlarged lymph node, etc.  He refuses further workup at this time.  Says \"if it comes back I will let the nurses know.\"    Past Medical History:   Diagnosis Date    Cancer (HCC)     Hyperlipidemia     Hypertension     IBS (irritable bowel syndrome)          Allergies:  Reviewed in nursing facility records  Medications:  Reviewed and reconciled in nursing facility records    Review of Systems  A 10 Point review of systems was performed and is negative unless previously stated      Objective:   See nursing facility record for vital signs.      Physical Exam  Constitutional:  up with rollator, elderly, frail, in no acute distess  Pulmonary/Chest:  lung sounds clear and diminished in lower lobes, no shortness of breath  Cardiovascular:  S1-S2 regular, no edema  Abd/GI:  abdomen soft, round, active bowel sounds x 4 quadrants  MS/Extremities:  OLIVARES, ROM limited, abnormal gait  Psych:  A/O x 3, cooperative with care     Diagnosis Orders   1. Left groin pain        2. Mass of left inguinal region               Assessment and Plan:      1. Left groin pain/mass of left inguinal region  Nothing visible upon exam.  Continue to monitor and report to MD/NP if pain mass or swelling

## 2025-04-18 ENCOUNTER — APPOINTMENT (OUTPATIENT)
Dept: OTOLARYNGOLOGY | Facility: CLINIC | Age: 80
End: 2025-04-18
Payer: COMMERCIAL

## 2025-04-23 ENCOUNTER — APPOINTMENT (OUTPATIENT)
Dept: OTOLARYNGOLOGY | Facility: CLINIC | Age: 80
End: 2025-04-23
Payer: COMMERCIAL

## 2025-04-23 ENCOUNTER — CLINICAL SUPPORT (OUTPATIENT)
Dept: AUDIOLOGY | Facility: CLINIC | Age: 80
End: 2025-04-23
Payer: COMMERCIAL

## 2025-04-23 DIAGNOSIS — R42 DIZZINESS AND GIDDINESS: ICD-10-CM

## 2025-04-23 DIAGNOSIS — H90.3 BILATERAL SENSORINEURAL HEARING LOSS: Primary | ICD-10-CM

## 2025-04-23 DIAGNOSIS — H61.23 BILATERAL IMPACTED CERUMEN: Primary | ICD-10-CM

## 2025-04-23 DIAGNOSIS — H90.3 BILATERAL SENSORINEURAL HEARING LOSS: ICD-10-CM

## 2025-04-23 PROCEDURE — 92567 TYMPANOMETRY: CPT | Performed by: AUDIOLOGIST

## 2025-04-23 PROCEDURE — 1159F MED LIST DOCD IN RCRD: CPT | Performed by: OTOLARYNGOLOGY

## 2025-04-23 PROCEDURE — 92557 COMPREHENSIVE HEARING TEST: CPT | Performed by: AUDIOLOGIST

## 2025-04-23 PROCEDURE — 69210 REMOVE IMPACTED EAR WAX UNI: CPT | Performed by: OTOLARYNGOLOGY

## 2025-04-23 PROCEDURE — 99203 OFFICE O/P NEW LOW 30 MIN: CPT | Performed by: OTOLARYNGOLOGY

## 2025-04-23 PROCEDURE — 1160F RVW MEDS BY RX/DR IN RCRD: CPT | Performed by: OTOLARYNGOLOGY

## 2025-04-23 NOTE — PROGRESS NOTES
Mr. Frias, age 80, was seen today for a hearing evaluation during his ENT visit with Dr. Corral following cerumen removal by Adrienne Messer CNP.  He denied hearing concern but reported some intermittent dizziness over the past three months, which has been room spinning at times as well imbalance.    Results:  Otoscopy revealed clear ear canals and tympanic membranes were visualized bilaterally.  Tympanometry revealed normal, Type A tympanograms, indicating normal ear canal volume, peak pressure and compliance bilaterally.   Audiometric thresholds revealed a mild sloping to severe sensorineural hearing loss bilaterally.  Word recognition scores were good bilaterally.    Recommendations:  Follow-up with PCP, Dr. Lin, as medically directed.  Follow-up with ENT, Dr. Corral, as medically directed.  Consideration for binaural amplification should hearing or concern for hearing worsen.  Retest hearing annually to monitor.

## 2025-04-23 NOTE — PROGRESS NOTES
Subjective   Patient ID: Shad Trujillo is a 80 y.o. male who presents for No chief complaint on file.    HPI  New patient being seen for evaluation of ears, dizziness. Feels irritation, fullness in right ear for several months now. Denies acute pain, otorrhea, decreased hearing. States that right ear sensation makes him feel imbalanced, dizzy. Did experience several episodes of room-spinning vertigo several months ago, but none since then. Dizziness happens primarily when getting up quickly from laying, standing. Patient does believe he takes BP meds. Makes patient nervous to drive and so family has taken away his car.    All remaining head neck inquiry otherwise negative.     Review of Systems   Constitutional: Negative.    HENT: Negative.     Respiratory: Negative.     Cardiovascular: Negative.    Neurological: Negative.      Physical Exam  General appearance: No acute distress. Normal facies. Symmetric facial movement. No gross lesions of the face are noted.  Ears:  Bilateral cerumen was removed under the microscope with use of curette, alligator forceps, and hydrogen peroxide with suction as necessary. Following the removal, the ear structures appear to be otherwise grossly normal.    Nose:  Anterior rhinoscopy notes essentially a midline nasal septum. Examination is noted for normal healthy mucosal membranes without any evidence of lesions, polyps, or exudate.   Throat/Oral mucosa:  The tongue is normally mobile. There are no lesions on the gingiva, buccal, or oral mucosa. There are no oral cavity masses.  Neck:  The neck is negative for mass lymphadenopathy. The trachea and parotid are clear. The thyroid bed is grossly unremarkable. The salivary gland structures are grossly unremarkable.    Procedure:  Bilateral impacted cerumen removed under otomicroscopic examination using suction, curette, and forceps. Patient tolerated procedure without difficulty. Following removal, TMs are intact with no sign of  infection, effusion, retraction, or perforation.     Audiogram:  Completed today shows mild sloping to moderately severe bilateral sensorineural hearing loss. Bilateral type A tympanograms. WRS right ear 80%, left ear 84%.     Assessment/Plan   Patient presents for evaluation of cerumen impaction. The ears were cleaned using otomicroscope and instrumentation.  Patient tolerated the procedure well. All questions were answered to patient's satisfaction. Patient to follow-up as needed.   Dizziness and giddiness. Suspect that this is related to BP and medication. Recommend that patient increase daily water intake and get up from sitting and laying slowly and carefully.   Bilateral sensorineural hearing loss. Patient could certainly benefit from binaural hearing aids. If he does not pursue these at this time, will see back for hearing test in 1 year.

## 2025-06-06 ENCOUNTER — OFFICE VISIT (OUTPATIENT)
Dept: GERIATRIC MEDICINE | Age: 80
End: 2025-06-06
Payer: MEDICARE

## 2025-06-06 DIAGNOSIS — R42 DIZZINESS: ICD-10-CM

## 2025-06-06 DIAGNOSIS — R05.1 ACUTE COUGH: ICD-10-CM

## 2025-06-06 DIAGNOSIS — R11.2 NAUSEA AND VOMITING, UNSPECIFIED VOMITING TYPE: Primary | ICD-10-CM

## 2025-06-06 PROCEDURE — G8420 CALC BMI NORM PARAMETERS: HCPCS | Performed by: NURSE PRACTITIONER

## 2025-06-06 PROCEDURE — 1123F ACP DISCUSS/DSCN MKR DOCD: CPT | Performed by: NURSE PRACTITIONER

## 2025-06-06 PROCEDURE — 1036F TOBACCO NON-USER: CPT | Performed by: NURSE PRACTITIONER

## 2025-06-06 PROCEDURE — 99348 HOME/RES VST EST LOW MDM 30: CPT | Performed by: NURSE PRACTITIONER

## 2025-06-30 PROBLEM — R42 DIZZINESS: Status: ACTIVE | Noted: 2025-06-30

## 2025-06-30 PROBLEM — R05.1 ACUTE COUGH: Status: ACTIVE | Noted: 2025-06-30

## 2025-06-30 PROBLEM — R11.2 NAUSEA AND VOMITING: Status: ACTIVE | Noted: 2025-06-30

## 2025-06-30 NOTE — PROGRESS NOTES
Name: Costa Ferrera   Facility: [facility] at [address]  [id number]    Date: 6/6/2025     Subjective:     Chief Complaint   Patient presents with    Follow-up       HPI  Costa Ferrera is a 80 y.o. male being seen today for  evaluation of cough, nausea and vomiting, and dizziness.  Was c/o cough, n/v and dizziness for the past two days.  CXR was done and was negative for acute pulmonary process.  Vital signs stable no fever.  Today resident is up with his walker, in no acute distress.  Says dizziness has resolved, has occasional nausea no vomiting.  Appetite poor today.  Will continue to monitor.    Past Medical History:   Diagnosis Date    Cancer (HCC)     Hyperlipidemia     Hypertension     IBS (irritable bowel syndrome)          Allergies:  Reviewed in nursing facility records  Medications:  Reviewed and reconciled in nursing facility records    Review of Systems  A 10 Point review of systems was performed and is negative unless previously stated      Objective:   See nursing facility record for vital signs.      Physical Exam  Constitutional:  up with walker, elderly, thin, in no acute distess  Pulmonary/Chest:  lung sounds clear and diminished in lower lobes, no shortness of breath  Cardiovascular:  S1-S2 regular, no edema  Abd/GI:  abdomen soft, round, active bowel sounds x 4 quadrants  MS/Extremities:  OLIVARES, ROM limited, abnormal gait  Psych:  A/O x 3, cooperative with care      Diagnosis Orders   1. Nausea and vomiting, unspecified vomiting type        2. Acute cough        3. Dizziness              Assessment and Plan:      1. Nausea and vomiting, unspecified vomiting type  Improving although still some nausea.  Zofran 4 mg p.o. twice daily as needed.    2. Acute cough  Nonproductive cough, DC Mucinex.    3. Dizziness  Resolved.      Reviewed labs:  Yes    CBC  Lab Results   Component Value Date/Time    WBC 5.2 02/04/2025 04:01 PM    RBC 3.21 02/04/2025 04:01 PM

## 2026-04-27 ENCOUNTER — APPOINTMENT (OUTPATIENT)
Dept: OTOLARYNGOLOGY | Facility: CLINIC | Age: 81
End: 2026-04-27
Payer: COMMERCIAL